# Patient Record
Sex: MALE | Race: WHITE | NOT HISPANIC OR LATINO | URBAN - METROPOLITAN AREA
[De-identification: names, ages, dates, MRNs, and addresses within clinical notes are randomized per-mention and may not be internally consistent; named-entity substitution may affect disease eponyms.]

---

## 2018-03-21 LAB
ALBUMIN SERPL BCP-MCNC: 4.6 G/DL (ref 3.5–5.7)
ALP SERPL-CCNC: 31 IU/L (ref 40–150)
ALT SERPL W P-5'-P-CCNC: 23 IU/L (ref 0–50)
ANION GAP SERPL CALCULATED.3IONS-SCNC: 11.4 MM/L
AST SERPL W P-5'-P-CCNC: 14 U/L (ref 8–27)
BASOPHILS # BLD AUTO: 0.1 X3/UL (ref 0–0.3)
BASOPHILS # BLD AUTO: 1.1 % (ref 0–2)
BILIRUB SERPL-MCNC: 0.7 MG/DL (ref 0.3–1)
BUN SERPL-MCNC: 16 MG/DL (ref 7–25)
CALCIUM SERPL-MCNC: 9.6 MG/DL (ref 8.6–10.5)
CHLORIDE SERPL-SCNC: 103 MM/L (ref 98–107)
CO2 SERPL-SCNC: 31 MM/L (ref 21–31)
CREAT SERPL-MCNC: 1.02 MG/DL (ref 0.7–1.3)
DEPRECATED RDW RBC AUTO: 12.8 % (ref 11.5–14.5)
EGFR (HISTORICAL): > 60 GFR
EGFR AFRICAN AMERICAN (HISTORICAL): > 60 GFR
EOSINOPHIL # BLD AUTO: 0.1 X3/UL (ref 0–0.5)
EOSINOPHIL NFR BLD AUTO: 2.3 % (ref 0–5)
GLUCOSE (HISTORICAL): 93 MG/DL (ref 65–99)
HCT VFR BLD AUTO: 45.2 % (ref 42–52)
HGB BLD-MCNC: 16 G/DL (ref 14–18)
LYMPHOCYTES # BLD AUTO: 1.3 X3/UL (ref 1.2–4.2)
LYMPHOCYTES NFR BLD AUTO: 29.4 % (ref 20.5–51.1)
MCH RBC QN AUTO: 31.1 PG (ref 26–34)
MCHC RBC AUTO-ENTMCNC: 35.3 G/DL (ref 31–36)
MCV RBC AUTO: 88.1 FL (ref 81–99)
MONOCYTES # BLD AUTO: 0.3 X3/UL (ref 0–1)
MONOCYTES NFR BLD AUTO: 7.6 % (ref 1.7–12)
NEUTROPHILS # BLD AUTO: 2.7 X3/UL (ref 1.4–6.5)
NEUTS SEG NFR BLD AUTO: 59.6 % (ref 42.2–75.2)
OSMOLALITY, SERUM (HISTORICAL): 282 MOSM (ref 262–291)
PLATELET # BLD AUTO: 212 X3/UL (ref 130–400)
PMV BLD AUTO: 8.4 FL (ref 8.6–11.7)
POTASSIUM SERPL-SCNC: 4.4 MM/L (ref 3.5–5.5)
RBC # BLD AUTO: 5.13 X6/UL (ref 4.3–5.9)
SODIUM SERPL-SCNC: 141 MM/L (ref 134–143)
TOTAL PROTEIN (HISTORICAL): 6.8 G/DL (ref 6.4–8.9)
WBC # BLD AUTO: 4.5 X3/UL (ref 4.8–10.8)

## 2018-03-22 LAB
HEPATITIS A IGM ANTIBODY (HISTORICAL): NEGATIVE
HEPATITIS B CORE IGM ANTIBODY (HISTORICAL): NEGATIVE
HEPATITIS B SURFACE ANTIGEN (HISTORICAL): NEGATIVE
HEPATITIS C ANTIBODY (HISTORICAL): <0.1 S/CO (ref 0–0.9)
VARICELLA ZOSTER (HISTORICAL): >4000 INDEX
VARICELLA ZOSTER IGM (HISTORICAL): <0.91 INDEX (ref 0–0.9)

## 2018-03-23 LAB
INTERPRETATION (HISTORICAL): NORMAL
QAUNTIFERON NIL VALUE (HISTORICAL): 0.03 IU/ML
QFT TB AG MINUS NIL VALUE (HISTORICAL): 0 IU/ML
QUANTIFERON CRITERIA (HISTORICAL): NORMAL
QUANTIFERON MITOGEN VALUE (HISTORICAL): 6.74 IU/ML
QUANTIFERON TB AG VALUE (HISTORICAL): 0.03 IU/ML
QUANTIFERON TB GOLD (HISTORICAL): NEGATIVE
QUANTIFERON TB GOLD (HISTORICAL): NORMAL

## 2018-10-30 ENCOUNTER — APPOINTMENT (OUTPATIENT)
Dept: LAB | Facility: MEDICAL CENTER | Age: 53
End: 2018-10-30
Payer: COMMERCIAL

## 2018-10-30 ENCOUNTER — TRANSCRIBE ORDERS (OUTPATIENT)
Dept: LAB | Facility: MEDICAL CENTER | Age: 53
End: 2018-10-30

## 2018-10-30 DIAGNOSIS — R53.83 OTHER FATIGUE: ICD-10-CM

## 2018-10-30 DIAGNOSIS — M25.50 ARTHRALGIA, UNSPECIFIED JOINT: ICD-10-CM

## 2018-10-30 DIAGNOSIS — R53.83 OTHER FATIGUE: Primary | ICD-10-CM

## 2018-10-30 DIAGNOSIS — G35: ICD-10-CM

## 2018-10-30 LAB
ALBUMIN SERPL BCP-MCNC: 4.3 G/DL (ref 3.5–5)
ALP SERPL-CCNC: 31 U/L (ref 46–116)
ALT SERPL W P-5'-P-CCNC: 30 U/L (ref 12–78)
ANION GAP SERPL CALCULATED.3IONS-SCNC: 6 MMOL/L (ref 4–13)
AST SERPL W P-5'-P-CCNC: 14 U/L (ref 5–45)
BILIRUB SERPL-MCNC: 1.58 MG/DL (ref 0.2–1)
BUN SERPL-MCNC: 24 MG/DL (ref 5–25)
CALCIUM SERPL-MCNC: 8.9 MG/DL (ref 8.3–10.1)
CHLORIDE SERPL-SCNC: 105 MMOL/L (ref 100–108)
CO2 SERPL-SCNC: 29 MMOL/L (ref 21–32)
CREAT SERPL-MCNC: 0.91 MG/DL (ref 0.6–1.3)
ERYTHROCYTE [DISTWIDTH] IN BLOOD BY AUTOMATED COUNT: 12.8 % (ref 11.6–15.1)
GFR SERPL CREATININE-BSD FRML MDRD: 97 ML/MIN/1.73SQ M
GLUCOSE P FAST SERPL-MCNC: 77 MG/DL (ref 65–99)
HCT VFR BLD AUTO: 46.2 % (ref 36.5–49.3)
HGB BLD-MCNC: 15.6 G/DL (ref 12–17)
MCH RBC QN AUTO: 30.5 PG (ref 26.8–34.3)
MCHC RBC AUTO-ENTMCNC: 33.8 G/DL (ref 31.4–37.4)
MCV RBC AUTO: 90 FL (ref 82–98)
PLATELET # BLD AUTO: 165 THOUSANDS/UL (ref 149–390)
PMV BLD AUTO: 10.8 FL (ref 8.9–12.7)
POTASSIUM SERPL-SCNC: 4.2 MMOL/L (ref 3.5–5.3)
PROT SERPL-MCNC: 7.5 G/DL (ref 6.4–8.2)
RBC # BLD AUTO: 5.12 MILLION/UL (ref 3.88–5.62)
SODIUM SERPL-SCNC: 140 MMOL/L (ref 136–145)
WBC # BLD AUTO: 3.06 THOUSAND/UL (ref 4.31–10.16)

## 2018-10-30 PROCEDURE — 80053 COMPREHEN METABOLIC PANEL: CPT

## 2018-10-30 PROCEDURE — 85027 COMPLETE CBC AUTOMATED: CPT

## 2018-10-30 PROCEDURE — 36415 COLL VENOUS BLD VENIPUNCTURE: CPT

## 2019-07-02 ENCOUNTER — APPOINTMENT (OUTPATIENT)
Dept: LAB | Facility: MEDICAL CENTER | Age: 54
End: 2019-07-02
Payer: COMMERCIAL

## 2019-07-02 ENCOUNTER — TRANSCRIBE ORDERS (OUTPATIENT)
Dept: LAB | Facility: MEDICAL CENTER | Age: 54
End: 2019-07-02

## 2019-07-02 DIAGNOSIS — E53.8 VITAMIN B 12 DEFICIENCY: ICD-10-CM

## 2019-07-02 DIAGNOSIS — M89.9 BONE DISEASE: ICD-10-CM

## 2019-07-02 DIAGNOSIS — G35 MULTIPLE SCLEROSIS (HCC): ICD-10-CM

## 2019-07-02 DIAGNOSIS — R53.83 FATIGUE, UNSPECIFIED TYPE: ICD-10-CM

## 2019-07-02 DIAGNOSIS — Z12.5 SCREENING PSA (PROSTATE SPECIFIC ANTIGEN): ICD-10-CM

## 2019-07-02 DIAGNOSIS — E78.5 HYPERLIPIDEMIA, UNSPECIFIED HYPERLIPIDEMIA TYPE: ICD-10-CM

## 2019-07-02 DIAGNOSIS — Z51.81 ENCOUNTER FOR THERAPEUTIC DRUG MONITORING: ICD-10-CM

## 2019-07-02 DIAGNOSIS — Z51.81 ENCOUNTER FOR THERAPEUTIC DRUG MONITORING: Primary | ICD-10-CM

## 2019-07-02 DIAGNOSIS — E78.5 HYPERLIPIDEMIA, UNSPECIFIED HYPERLIPIDEMIA TYPE: Primary | ICD-10-CM

## 2019-07-02 LAB
25(OH)D3 SERPL-MCNC: 37.6 NG/ML (ref 30–100)
BASOPHILS # BLD AUTO: 0.02 THOUSANDS/ΜL (ref 0–0.1)
BASOPHILS NFR BLD AUTO: 1 % (ref 0–1)
CHOLEST SERPL-MCNC: 246 MG/DL (ref 50–200)
EOSINOPHIL # BLD AUTO: 0.04 THOUSAND/ΜL (ref 0–0.61)
EOSINOPHIL NFR BLD AUTO: 1 % (ref 0–6)
ERYTHROCYTE [DISTWIDTH] IN BLOOD BY AUTOMATED COUNT: 12.7 % (ref 11.6–15.1)
HCT VFR BLD AUTO: 44.5 % (ref 36.5–49.3)
HDLC SERPL-MCNC: 48 MG/DL (ref 40–60)
HGB BLD-MCNC: 14.9 G/DL (ref 12–17)
IMM GRANULOCYTES # BLD AUTO: 0.01 THOUSAND/UL (ref 0–0.2)
IMM GRANULOCYTES NFR BLD AUTO: 0 % (ref 0–2)
LDLC SERPL CALC-MCNC: 167 MG/DL (ref 0–100)
LYMPHOCYTES # BLD AUTO: 0.34 THOUSANDS/ΜL (ref 0.6–4.47)
LYMPHOCYTES NFR BLD AUTO: 10 % (ref 14–44)
MCH RBC QN AUTO: 30.2 PG (ref 26.8–34.3)
MCHC RBC AUTO-ENTMCNC: 33.5 G/DL (ref 31.4–37.4)
MCV RBC AUTO: 90 FL (ref 82–98)
MONOCYTES # BLD AUTO: 0.32 THOUSAND/ΜL (ref 0.17–1.22)
MONOCYTES NFR BLD AUTO: 9 % (ref 4–12)
NEUTROPHILS # BLD AUTO: 2.76 THOUSANDS/ΜL (ref 1.85–7.62)
NEUTS SEG NFR BLD AUTO: 79 % (ref 43–75)
NONHDLC SERPL-MCNC: 198 MG/DL
NRBC BLD AUTO-RTO: 0 /100 WBCS
PLATELET # BLD AUTO: 173 THOUSANDS/UL (ref 149–390)
PMV BLD AUTO: 10.7 FL (ref 8.9–12.7)
PSA SERPL-MCNC: 0.4 NG/ML (ref 0–4)
RBC # BLD AUTO: 4.93 MILLION/UL (ref 3.88–5.62)
T4 FREE SERPL-MCNC: 1.05 NG/DL (ref 0.76–1.46)
TRIGL SERPL-MCNC: 155 MG/DL
TSH SERPL DL<=0.05 MIU/L-ACNC: 2 UIU/ML (ref 0.36–3.74)
VIT B12 SERPL-MCNC: 746 PG/ML (ref 100–900)
WBC # BLD AUTO: 3.49 THOUSAND/UL (ref 4.31–10.16)

## 2019-07-02 PROCEDURE — 84443 ASSAY THYROID STIM HORMONE: CPT

## 2019-07-02 PROCEDURE — G0103 PSA SCREENING: HCPCS

## 2019-07-02 PROCEDURE — 82306 VITAMIN D 25 HYDROXY: CPT

## 2019-07-02 PROCEDURE — 85025 COMPLETE CBC W/AUTO DIFF WBC: CPT

## 2019-07-02 PROCEDURE — 84439 ASSAY OF FREE THYROXINE: CPT

## 2019-07-02 PROCEDURE — 82607 VITAMIN B-12: CPT

## 2019-07-02 PROCEDURE — 36415 COLL VENOUS BLD VENIPUNCTURE: CPT

## 2019-07-02 PROCEDURE — 80061 LIPID PANEL: CPT

## 2019-10-24 ENCOUNTER — APPOINTMENT (OUTPATIENT)
Dept: LAB | Facility: MEDICAL CENTER | Age: 54
End: 2019-10-24
Payer: COMMERCIAL

## 2019-10-24 ENCOUNTER — TRANSCRIBE ORDERS (OUTPATIENT)
Dept: LAB | Facility: MEDICAL CENTER | Age: 54
End: 2019-10-24

## 2019-10-24 DIAGNOSIS — E55.9 VITAMIN D DEFICIENCY: ICD-10-CM

## 2019-10-24 DIAGNOSIS — E55.9 VITAMIN D DEFICIENCY: Primary | ICD-10-CM

## 2019-10-24 DIAGNOSIS — D64.9 ANEMIA, UNSPECIFIED TYPE: ICD-10-CM

## 2019-10-24 LAB
ERYTHROCYTE [DISTWIDTH] IN BLOOD BY AUTOMATED COUNT: 13 % (ref 11.6–15.1)
HCT VFR BLD AUTO: 44.2 % (ref 36.5–49.3)
HGB BLD-MCNC: 15 G/DL (ref 12–17)
MCH RBC QN AUTO: 30.5 PG (ref 26.8–34.3)
MCHC RBC AUTO-ENTMCNC: 33.9 G/DL (ref 31.4–37.4)
MCV RBC AUTO: 90 FL (ref 82–98)
PLATELET # BLD AUTO: 182 THOUSANDS/UL (ref 149–390)
PMV BLD AUTO: 10.8 FL (ref 8.9–12.7)
RBC # BLD AUTO: 4.91 MILLION/UL (ref 3.88–5.62)
WBC # BLD AUTO: 3.23 THOUSAND/UL (ref 4.31–10.16)

## 2019-10-24 PROCEDURE — 85027 COMPLETE CBC AUTOMATED: CPT

## 2019-10-24 PROCEDURE — 82306 VITAMIN D 25 HYDROXY: CPT

## 2019-10-24 PROCEDURE — 36415 COLL VENOUS BLD VENIPUNCTURE: CPT

## 2019-10-25 LAB — 25(OH)D3 SERPL-MCNC: 56.4 NG/ML (ref 30–100)

## 2019-10-31 ENCOUNTER — TRANSCRIBE ORDERS (OUTPATIENT)
Dept: LAB | Facility: MEDICAL CENTER | Age: 54
End: 2019-10-31

## 2019-10-31 ENCOUNTER — APPOINTMENT (OUTPATIENT)
Dept: LAB | Facility: MEDICAL CENTER | Age: 54
End: 2019-10-31
Payer: COMMERCIAL

## 2019-10-31 DIAGNOSIS — D70.9 NEUTROPENIA, UNSPECIFIED TYPE (HCC): Primary | ICD-10-CM

## 2019-10-31 DIAGNOSIS — D70.9 NEUTROPENIA, UNSPECIFIED TYPE (HCC): ICD-10-CM

## 2019-10-31 LAB
BASOPHILS # BLD AUTO: 0.05 THOUSANDS/ΜL (ref 0–0.1)
BASOPHILS NFR BLD AUTO: 1 % (ref 0–1)
EOSINOPHIL # BLD AUTO: 0.08 THOUSAND/ΜL (ref 0–0.61)
EOSINOPHIL NFR BLD AUTO: 2 % (ref 0–6)
ERYTHROCYTE [DISTWIDTH] IN BLOOD BY AUTOMATED COUNT: 13.1 % (ref 11.6–15.1)
HCT VFR BLD AUTO: 45.2 % (ref 36.5–49.3)
HGB BLD-MCNC: 14.8 G/DL (ref 12–17)
IMM GRANULOCYTES # BLD AUTO: 0.01 THOUSAND/UL (ref 0–0.2)
IMM GRANULOCYTES NFR BLD AUTO: 0 % (ref 0–2)
LYMPHOCYTES # BLD AUTO: 0.4 THOUSANDS/ΜL (ref 0.6–4.47)
LYMPHOCYTES NFR BLD AUTO: 10 % (ref 14–44)
MCH RBC QN AUTO: 30 PG (ref 26.8–34.3)
MCHC RBC AUTO-ENTMCNC: 32.7 G/DL (ref 31.4–37.4)
MCV RBC AUTO: 92 FL (ref 82–98)
MONOCYTES # BLD AUTO: 0.53 THOUSAND/ΜL (ref 0.17–1.22)
MONOCYTES NFR BLD AUTO: 13 % (ref 4–12)
NEUTROPHILS # BLD AUTO: 2.96 THOUSANDS/ΜL (ref 1.85–7.62)
NEUTS SEG NFR BLD AUTO: 74 % (ref 43–75)
NRBC BLD AUTO-RTO: 0 /100 WBCS
PLATELET # BLD AUTO: 189 THOUSANDS/UL (ref 149–390)
PMV BLD AUTO: 10.5 FL (ref 8.9–12.7)
RBC # BLD AUTO: 4.94 MILLION/UL (ref 3.88–5.62)
WBC # BLD AUTO: 4.03 THOUSAND/UL (ref 4.31–10.16)

## 2019-10-31 PROCEDURE — 36415 COLL VENOUS BLD VENIPUNCTURE: CPT

## 2019-10-31 PROCEDURE — 85025 COMPLETE CBC W/AUTO DIFF WBC: CPT

## 2020-03-12 ENCOUNTER — TRANSCRIBE ORDERS (OUTPATIENT)
Dept: LAB | Facility: MEDICAL CENTER | Age: 55
End: 2020-03-12

## 2020-03-12 ENCOUNTER — APPOINTMENT (OUTPATIENT)
Dept: LAB | Facility: MEDICAL CENTER | Age: 55
End: 2020-03-12
Payer: COMMERCIAL

## 2020-03-12 DIAGNOSIS — Z51.81 ENCOUNTER FOR THERAPEUTIC DRUG MONITORING: ICD-10-CM

## 2020-03-12 DIAGNOSIS — G35 MULTIPLE SCLEROSIS (HCC): Primary | ICD-10-CM

## 2020-03-12 DIAGNOSIS — G35 MULTIPLE SCLEROSIS (HCC): ICD-10-CM

## 2020-03-12 LAB
ALBUMIN SERPL BCP-MCNC: 4.4 G/DL (ref 3.5–5)
ALP SERPL-CCNC: 34 U/L (ref 46–116)
ALT SERPL W P-5'-P-CCNC: 21 U/L (ref 12–78)
AST SERPL W P-5'-P-CCNC: 7 U/L (ref 5–45)
BASOPHILS # BLD AUTO: 0.04 THOUSANDS/ΜL (ref 0–0.1)
BASOPHILS NFR BLD AUTO: 1 % (ref 0–1)
BILIRUB DIRECT SERPL-MCNC: 0.22 MG/DL (ref 0–0.2)
BILIRUB SERPL-MCNC: 1.45 MG/DL (ref 0.2–1)
EOSINOPHIL # BLD AUTO: 0.05 THOUSAND/ΜL (ref 0–0.61)
EOSINOPHIL NFR BLD AUTO: 1 % (ref 0–6)
ERYTHROCYTE [DISTWIDTH] IN BLOOD BY AUTOMATED COUNT: 12.6 % (ref 11.6–15.1)
HCT VFR BLD AUTO: 47.9 % (ref 36.5–49.3)
HGB BLD-MCNC: 15.8 G/DL (ref 12–17)
IMM GRANULOCYTES # BLD AUTO: 0.02 THOUSAND/UL (ref 0–0.2)
IMM GRANULOCYTES NFR BLD AUTO: 0 % (ref 0–2)
LYMPHOCYTES # BLD AUTO: 0.41 THOUSANDS/ΜL (ref 0.6–4.47)
LYMPHOCYTES NFR BLD AUTO: 9 % (ref 14–44)
MCH RBC QN AUTO: 30.3 PG (ref 26.8–34.3)
MCHC RBC AUTO-ENTMCNC: 33 G/DL (ref 31.4–37.4)
MCV RBC AUTO: 92 FL (ref 82–98)
MONOCYTES # BLD AUTO: 0.38 THOUSAND/ΜL (ref 0.17–1.22)
MONOCYTES NFR BLD AUTO: 9 % (ref 4–12)
NEUTROPHILS # BLD AUTO: 3.57 THOUSANDS/ΜL (ref 1.85–7.62)
NEUTS SEG NFR BLD AUTO: 80 % (ref 43–75)
NRBC BLD AUTO-RTO: 0 /100 WBCS
PLATELET # BLD AUTO: 175 THOUSANDS/UL (ref 149–390)
PMV BLD AUTO: 10.9 FL (ref 8.9–12.7)
PROT SERPL-MCNC: 7.4 G/DL (ref 6.4–8.2)
RBC # BLD AUTO: 5.21 MILLION/UL (ref 3.88–5.62)
WBC # BLD AUTO: 4.47 THOUSAND/UL (ref 4.31–10.16)

## 2020-03-12 PROCEDURE — 36415 COLL VENOUS BLD VENIPUNCTURE: CPT

## 2020-03-12 PROCEDURE — 85025 COMPLETE CBC W/AUTO DIFF WBC: CPT

## 2020-03-12 PROCEDURE — 80076 HEPATIC FUNCTION PANEL: CPT

## 2020-12-01 ENCOUNTER — TRANSCRIBE ORDERS (OUTPATIENT)
Dept: LAB | Facility: MEDICAL CENTER | Age: 55
End: 2020-12-01

## 2020-12-01 ENCOUNTER — LAB (OUTPATIENT)
Dept: LAB | Facility: MEDICAL CENTER | Age: 55
End: 2020-12-01
Payer: COMMERCIAL

## 2020-12-01 DIAGNOSIS — G35 MS (MULTIPLE SCLEROSIS) (HCC): ICD-10-CM

## 2020-12-01 DIAGNOSIS — G35 MS (MULTIPLE SCLEROSIS) (HCC): Primary | ICD-10-CM

## 2020-12-01 DIAGNOSIS — E55.9 VITAMIN D DEFICIENCY: ICD-10-CM

## 2020-12-01 DIAGNOSIS — Z51.81 ENCOUNTER FOR THERAPEUTIC DRUG MONITORING: ICD-10-CM

## 2020-12-01 LAB
25(OH)D3 SERPL-MCNC: 79 NG/ML (ref 30–100)
ALBUMIN SERPL BCP-MCNC: 4.5 G/DL (ref 3.5–5)
ALP SERPL-CCNC: 37 U/L (ref 46–116)
ALT SERPL W P-5'-P-CCNC: 20 U/L (ref 12–78)
AST SERPL W P-5'-P-CCNC: 8 U/L (ref 5–45)
BASOPHILS # BLD AUTO: 0.04 THOUSANDS/ΜL (ref 0–0.1)
BASOPHILS NFR BLD AUTO: 1 % (ref 0–1)
BILIRUB DIRECT SERPL-MCNC: 0.25 MG/DL (ref 0–0.2)
BILIRUB SERPL-MCNC: 1.5 MG/DL (ref 0.2–1)
EOSINOPHIL # BLD AUTO: 0.05 THOUSAND/ΜL (ref 0–0.61)
EOSINOPHIL NFR BLD AUTO: 1 % (ref 0–6)
ERYTHROCYTE [DISTWIDTH] IN BLOOD BY AUTOMATED COUNT: 12.5 % (ref 11.6–15.1)
HCT VFR BLD AUTO: 47.6 % (ref 36.5–49.3)
HGB BLD-MCNC: 15.7 G/DL (ref 12–17)
IMM GRANULOCYTES # BLD AUTO: 0.01 THOUSAND/UL (ref 0–0.2)
IMM GRANULOCYTES NFR BLD AUTO: 0 % (ref 0–2)
LYMPHOCYTES # BLD AUTO: 0.34 THOUSANDS/ΜL (ref 0.6–4.47)
LYMPHOCYTES NFR BLD AUTO: 9 % (ref 14–44)
MCH RBC QN AUTO: 30 PG (ref 26.8–34.3)
MCHC RBC AUTO-ENTMCNC: 33 G/DL (ref 31.4–37.4)
MCV RBC AUTO: 91 FL (ref 82–98)
MONOCYTES # BLD AUTO: 0.31 THOUSAND/ΜL (ref 0.17–1.22)
MONOCYTES NFR BLD AUTO: 8 % (ref 4–12)
NEUTROPHILS # BLD AUTO: 3.07 THOUSANDS/ΜL (ref 1.85–7.62)
NEUTS SEG NFR BLD AUTO: 81 % (ref 43–75)
NRBC BLD AUTO-RTO: 0 /100 WBCS
PLATELET # BLD AUTO: 185 THOUSANDS/UL (ref 149–390)
PMV BLD AUTO: 10.1 FL (ref 8.9–12.7)
PROT SERPL-MCNC: 7.6 G/DL (ref 6.4–8.2)
RBC # BLD AUTO: 5.23 MILLION/UL (ref 3.88–5.62)
WBC # BLD AUTO: 3.82 THOUSAND/UL (ref 4.31–10.16)

## 2020-12-01 PROCEDURE — 80076 HEPATIC FUNCTION PANEL: CPT

## 2020-12-01 PROCEDURE — 85025 COMPLETE CBC W/AUTO DIFF WBC: CPT

## 2020-12-01 PROCEDURE — 36415 COLL VENOUS BLD VENIPUNCTURE: CPT

## 2020-12-01 PROCEDURE — 82306 VITAMIN D 25 HYDROXY: CPT

## 2021-03-01 ENCOUNTER — TRANSCRIBE ORDERS (OUTPATIENT)
Dept: LAB | Facility: MEDICAL CENTER | Age: 56
End: 2021-03-01

## 2021-03-01 ENCOUNTER — LAB (OUTPATIENT)
Dept: LAB | Facility: MEDICAL CENTER | Age: 56
End: 2021-03-01
Payer: COMMERCIAL

## 2021-03-01 DIAGNOSIS — G35 MULTIPLE SCLEROSIS (HCC): Primary | ICD-10-CM

## 2021-03-01 DIAGNOSIS — G35 MULTIPLE SCLEROSIS (HCC): ICD-10-CM

## 2021-03-01 LAB
BASOPHILS # BLD AUTO: 0.07 THOUSANDS/ΜL (ref 0–0.1)
BASOPHILS NFR BLD AUTO: 1 % (ref 0–1)
EOSINOPHIL # BLD AUTO: 0.06 THOUSAND/ΜL (ref 0–0.61)
EOSINOPHIL NFR BLD AUTO: 1 % (ref 0–6)
ERYTHROCYTE [DISTWIDTH] IN BLOOD BY AUTOMATED COUNT: 12.3 % (ref 11.6–15.1)
HCT VFR BLD AUTO: 47.5 % (ref 36.5–49.3)
HGB BLD-MCNC: 16.1 G/DL (ref 12–17)
IMM GRANULOCYTES # BLD AUTO: 0.02 THOUSAND/UL (ref 0–0.2)
IMM GRANULOCYTES NFR BLD AUTO: 0 % (ref 0–2)
LYMPHOCYTES # BLD AUTO: 0.49 THOUSANDS/ΜL (ref 0.6–4.47)
LYMPHOCYTES NFR BLD AUTO: 10 % (ref 14–44)
MCH RBC QN AUTO: 29.7 PG (ref 26.8–34.3)
MCHC RBC AUTO-ENTMCNC: 33.9 G/DL (ref 31.4–37.4)
MCV RBC AUTO: 88 FL (ref 82–98)
MONOCYTES # BLD AUTO: 0.51 THOUSAND/ΜL (ref 0.17–1.22)
MONOCYTES NFR BLD AUTO: 10 % (ref 4–12)
NEUTROPHILS # BLD AUTO: 4.03 THOUSANDS/ΜL (ref 1.85–7.62)
NEUTS SEG NFR BLD AUTO: 78 % (ref 43–75)
NRBC BLD AUTO-RTO: 0 /100 WBCS
PLATELET # BLD AUTO: 200 THOUSANDS/UL (ref 149–390)
PMV BLD AUTO: 10.1 FL (ref 8.9–12.7)
RBC # BLD AUTO: 5.42 MILLION/UL (ref 3.88–5.62)
WBC # BLD AUTO: 5.18 THOUSAND/UL (ref 4.31–10.16)

## 2021-03-01 PROCEDURE — 36415 COLL VENOUS BLD VENIPUNCTURE: CPT

## 2021-03-01 PROCEDURE — 85025 COMPLETE CBC W/AUTO DIFF WBC: CPT

## 2021-03-10 DIAGNOSIS — Z23 ENCOUNTER FOR IMMUNIZATION: ICD-10-CM

## 2021-03-25 DIAGNOSIS — Z20.828 EXPOSURE TO SARS-ASSOCIATED CORONAVIRUS: ICD-10-CM

## 2021-03-25 DIAGNOSIS — R52 BODY ACHES: ICD-10-CM

## 2021-03-25 PROCEDURE — U0003 INFECTIOUS AGENT DETECTION BY NUCLEIC ACID (DNA OR RNA); SEVERE ACUTE RESPIRATORY SYNDROME CORONAVIRUS 2 (SARS-COV-2) (CORONAVIRUS DISEASE [COVID-19]), AMPLIFIED PROBE TECHNIQUE, MAKING USE OF HIGH THROUGHPUT TECHNOLOGIES AS DESCRIBED BY CMS-2020-01-R: HCPCS | Performed by: NURSE PRACTITIONER

## 2021-03-25 PROCEDURE — U0005 INFEC AGEN DETEC AMPLI PROBE: HCPCS | Performed by: NURSE PRACTITIONER

## 2021-03-27 LAB — SARS-COV-2 RNA RESP QL NAA+PROBE: POSITIVE

## 2021-12-22 PROCEDURE — U0005 INFEC AGEN DETEC AMPLI PROBE: HCPCS | Performed by: FAMILY MEDICINE

## 2021-12-22 PROCEDURE — U0003 INFECTIOUS AGENT DETECTION BY NUCLEIC ACID (DNA OR RNA); SEVERE ACUTE RESPIRATORY SYNDROME CORONAVIRUS 2 (SARS-COV-2) (CORONAVIRUS DISEASE [COVID-19]), AMPLIFIED PROBE TECHNIQUE, MAKING USE OF HIGH THROUGHPUT TECHNOLOGIES AS DESCRIBED BY CMS-2020-01-R: HCPCS | Performed by: FAMILY MEDICINE

## 2022-01-01 PROCEDURE — U0005 INFEC AGEN DETEC AMPLI PROBE: HCPCS | Performed by: INTERNAL MEDICINE

## 2022-01-01 PROCEDURE — U0003 INFECTIOUS AGENT DETECTION BY NUCLEIC ACID (DNA OR RNA); SEVERE ACUTE RESPIRATORY SYNDROME CORONAVIRUS 2 (SARS-COV-2) (CORONAVIRUS DISEASE [COVID-19]), AMPLIFIED PROBE TECHNIQUE, MAKING USE OF HIGH THROUGHPUT TECHNOLOGIES AS DESCRIBED BY CMS-2020-01-R: HCPCS | Performed by: INTERNAL MEDICINE

## 2022-07-13 ENCOUNTER — APPOINTMENT (OUTPATIENT)
Dept: LAB | Facility: MEDICAL CENTER | Age: 57
End: 2022-07-13
Payer: COMMERCIAL

## 2022-07-13 DIAGNOSIS — G35 MS (MULTIPLE SCLEROSIS) (HCC): ICD-10-CM

## 2022-07-13 DIAGNOSIS — R63.5 WEIGHT GAIN: ICD-10-CM

## 2022-07-13 DIAGNOSIS — R35.1 NOCTURIA: ICD-10-CM

## 2022-07-13 DIAGNOSIS — E78.5 HYPERLIPIDEMIA, UNSPECIFIED HYPERLIPIDEMIA TYPE: ICD-10-CM

## 2022-07-13 DIAGNOSIS — E53.8 LOW VITAMIN B12 LEVEL: ICD-10-CM

## 2022-07-13 DIAGNOSIS — N40.0 BENIGN PROSTATIC HYPERPLASIA, UNSPECIFIED WHETHER LOWER URINARY TRACT SYMPTOMS PRESENT: ICD-10-CM

## 2022-07-13 DIAGNOSIS — R53.83 OTHER FATIGUE: ICD-10-CM

## 2022-07-13 DIAGNOSIS — M19.90 OSTEOARTHRITIS, UNSPECIFIED OSTEOARTHRITIS TYPE, UNSPECIFIED SITE: ICD-10-CM

## 2022-07-13 LAB
ALBUMIN SERPL BCP-MCNC: 4.3 G/DL (ref 3.5–5)
ALP SERPL-CCNC: 36 U/L (ref 46–116)
ALT SERPL W P-5'-P-CCNC: 26 U/L (ref 12–78)
ANION GAP SERPL CALCULATED.3IONS-SCNC: 7 MMOL/L (ref 4–13)
AST SERPL W P-5'-P-CCNC: 14 U/L (ref 5–45)
BASOPHILS # BLD AUTO: 0.04 THOUSANDS/ΜL (ref 0–0.1)
BASOPHILS NFR BLD AUTO: 1 % (ref 0–1)
BILIRUB SERPL-MCNC: 1.53 MG/DL (ref 0.2–1)
BUN SERPL-MCNC: 20 MG/DL (ref 5–25)
CALCIUM SERPL-MCNC: 9.1 MG/DL (ref 8.3–10.1)
CHLORIDE SERPL-SCNC: 107 MMOL/L (ref 100–108)
CHOLEST SERPL-MCNC: 240 MG/DL
CO2 SERPL-SCNC: 24 MMOL/L (ref 21–32)
CREAT SERPL-MCNC: 0.98 MG/DL (ref 0.6–1.3)
EOSINOPHIL # BLD AUTO: 0.06 THOUSAND/ΜL (ref 0–0.61)
EOSINOPHIL NFR BLD AUTO: 1 % (ref 0–6)
ERYTHROCYTE [DISTWIDTH] IN BLOOD BY AUTOMATED COUNT: 12.9 % (ref 11.6–15.1)
GFR SERPL CREATININE-BSD FRML MDRD: 86 ML/MIN/1.73SQ M
GLUCOSE P FAST SERPL-MCNC: 91 MG/DL (ref 65–99)
HCT VFR BLD AUTO: 45.2 % (ref 36.5–49.3)
HDLC SERPL-MCNC: 47 MG/DL
HGB BLD-MCNC: 15 G/DL (ref 12–17)
IMM GRANULOCYTES # BLD AUTO: 0.02 THOUSAND/UL (ref 0–0.2)
IMM GRANULOCYTES NFR BLD AUTO: 1 % (ref 0–2)
LDLC SERPL CALC-MCNC: 149 MG/DL (ref 0–100)
LYMPHOCYTES # BLD AUTO: 0.71 THOUSANDS/ΜL (ref 0.6–4.47)
LYMPHOCYTES NFR BLD AUTO: 17 % (ref 14–44)
MCH RBC QN AUTO: 29.5 PG (ref 26.8–34.3)
MCHC RBC AUTO-ENTMCNC: 33.2 G/DL (ref 31.4–37.4)
MCV RBC AUTO: 89 FL (ref 82–98)
MONOCYTES # BLD AUTO: 0.37 THOUSAND/ΜL (ref 0.17–1.22)
MONOCYTES NFR BLD AUTO: 9 % (ref 4–12)
NEUTROPHILS # BLD AUTO: 2.96 THOUSANDS/ΜL (ref 1.85–7.62)
NEUTS SEG NFR BLD AUTO: 71 % (ref 43–75)
NONHDLC SERPL-MCNC: 193 MG/DL
NRBC BLD AUTO-RTO: 0 /100 WBCS
PLATELET # BLD AUTO: 192 THOUSANDS/UL (ref 149–390)
PMV BLD AUTO: 11 FL (ref 8.9–12.7)
POTASSIUM SERPL-SCNC: 4 MMOL/L (ref 3.5–5.3)
PROT SERPL-MCNC: 7.6 G/DL (ref 6.4–8.2)
PSA SERPL-MCNC: 0.8 NG/ML (ref 0–4)
RBC # BLD AUTO: 5.09 MILLION/UL (ref 3.88–5.62)
SODIUM SERPL-SCNC: 138 MMOL/L (ref 136–145)
T4 FREE SERPL-MCNC: 1.1 NG/DL (ref 0.76–1.46)
TRIGL SERPL-MCNC: 221 MG/DL
TSH SERPL DL<=0.05 MIU/L-ACNC: 2.59 UIU/ML (ref 0.45–4.5)
VIT B12 SERPL-MCNC: 570 PG/ML (ref 100–900)
WBC # BLD AUTO: 4.16 THOUSAND/UL (ref 4.31–10.16)

## 2022-07-13 PROCEDURE — G0103 PSA SCREENING: HCPCS

## 2022-07-13 PROCEDURE — 84443 ASSAY THYROID STIM HORMONE: CPT

## 2022-07-13 PROCEDURE — 80053 COMPREHEN METABOLIC PANEL: CPT

## 2022-07-13 PROCEDURE — 82607 VITAMIN B-12: CPT

## 2022-07-13 PROCEDURE — 85025 COMPLETE CBC W/AUTO DIFF WBC: CPT

## 2022-07-13 PROCEDURE — 80061 LIPID PANEL: CPT

## 2022-07-13 PROCEDURE — 36415 COLL VENOUS BLD VENIPUNCTURE: CPT

## 2022-07-13 PROCEDURE — 84439 ASSAY OF FREE THYROXINE: CPT

## 2023-10-27 ENCOUNTER — IMMUNIZATIONS (OUTPATIENT)
Dept: FAMILY MEDICINE CLINIC | Facility: CLINIC | Age: 58
End: 2023-10-27
Payer: COMMERCIAL

## 2023-10-27 DIAGNOSIS — Z23 ENCOUNTER FOR IMMUNIZATION: Primary | ICD-10-CM

## 2023-10-27 PROCEDURE — 90471 IMMUNIZATION ADMIN: CPT

## 2023-10-27 PROCEDURE — 90686 IIV4 VACC NO PRSV 0.5 ML IM: CPT

## 2023-11-20 DIAGNOSIS — U07.1 COVID: Primary | ICD-10-CM

## 2023-11-20 RX ORDER — NIRMATRELVIR AND RITONAVIR 300-100 MG
3 KIT ORAL 2 TIMES DAILY
Qty: 30 TABLET | Refills: 0 | Status: SHIPPED | OUTPATIENT
Start: 2023-11-20 | End: 2023-11-25

## 2023-11-28 ENCOUNTER — OFFICE VISIT (OUTPATIENT)
Dept: FAMILY MEDICINE CLINIC | Facility: CLINIC | Age: 58
End: 2023-11-28
Payer: COMMERCIAL

## 2023-11-28 VITALS
BODY MASS INDEX: 32.13 KG/M2 | TEMPERATURE: 97.8 F | DIASTOLIC BLOOD PRESSURE: 90 MMHG | WEIGHT: 258.4 LBS | HEART RATE: 86 BPM | OXYGEN SATURATION: 99 % | SYSTOLIC BLOOD PRESSURE: 130 MMHG | HEIGHT: 75 IN

## 2023-11-28 DIAGNOSIS — J32.9 SINUSITIS, UNSPECIFIED CHRONICITY, UNSPECIFIED LOCATION: ICD-10-CM

## 2023-11-28 DIAGNOSIS — U07.1 COVID: ICD-10-CM

## 2023-11-28 DIAGNOSIS — H81.311 VERTIGO, AURAL, RIGHT: Primary | ICD-10-CM

## 2023-11-28 DIAGNOSIS — G93.31 POSTVIRAL FATIGUE SYNDROME: ICD-10-CM

## 2023-11-28 DIAGNOSIS — E78.5 HYPERLIPIDEMIA, UNSPECIFIED HYPERLIPIDEMIA TYPE: ICD-10-CM

## 2023-11-28 DIAGNOSIS — R53.83 OTHER FATIGUE: ICD-10-CM

## 2023-11-28 PROCEDURE — 99213 OFFICE O/P EST LOW 20 MIN: CPT | Performed by: NURSE PRACTITIONER

## 2023-11-28 NOTE — PROGRESS NOTES
Name: Sintia Gross      : 1965      MRN: [id number]  Encounter Provider: DREW Amaro  Encounter Date: 2023   Encounter department: [address] [facility]    Assessment & Plan     1. Vertigo, aural, right  Comments:  meclizine otc    2. Sinusitis, unspecified chronicity, unspecified location  Comments:  viral-suda,flonase after labs, f/u inb    3. Postviral fatigue syndrome  Comments:  labs    4. COVID  Comments:  much improved        Depression Screening and Follow-up Plan: Patient was screened for depression during today's encounter. They screened negative with a PHQ-2 score of 0. Nina Edmonds presents on day 10 of his COVID illness. He felt awful through much of it. He felt aches and fever and fatigue head congestion. No chest pain or shortness of breath. Continues with a headache and sinus pressure. He feels lightheaded triggered by head movement. .  Balance feels a bit off. Does not feel syncopal.  Did return to work. Just very tired and trouble making it through the day. No rashes no joint pain no nausea vomiting or diarrhea. Appetites been good. Has had some Gatorade but his wife and he do not think he has been drinking enough fluids. Takes no medication. .  Last blood work was 16 months ago. Borderline WBCs. He has a diagnosis of MS that is been stable. He had been on medication but it was stopped during COVID crisis due to its potential effect on WBCs. Review of Systems   Constitutional:  Positive for fatigue and fever. Negative for appetite change, chills and diaphoresis. HENT:  Positive for sinus pressure and sinus pain. Negative for sore throat. Respiratory:  Negative for cough and shortness of breath. Cardiovascular:  Negative for palpitations and leg swelling. Gastrointestinal:  Negative for nausea and vomiting. Musculoskeletal:  Negative for arthralgias. Neurological:  Positive for light-headedness.  Negative for dizziness and syncope. No current outpatient medications on file prior to visit. Objective     /90 (BP Location: Left arm, Patient Position: Sitting, Cuff Size: Adult)   Pulse 86   Temp 97.8 °F (36.6 °C) (Tympanic)   Ht 6' 3" (1.905 m)   Wt 117 kg (258 lb 6.4 oz)   SpO2 99%   BMI 32.30 kg/m²     Physical Exam  Constitutional:       General: He is not in acute distress. Appearance: Normal appearance. He is normal weight. He is not ill-appearing, toxic-appearing or diaphoretic. HENT:      Head: Normocephalic. Comments: Right TM slightly retracted     Left Ear: Tympanic membrane normal.      Nose:      Comments: Nasal passages with turbinates very swollen and pink. Not not really red but there is a small amount of yellow discharge on the right     Mouth/Throat:      Mouth: Mucous membranes are moist.      Pharynx: Oropharynx is clear. Cardiovascular:      Rate and Rhythm: Normal rate and regular rhythm. Heart sounds: Normal heart sounds. No murmur heard. Comments: Bp 106/82 sitting   120 / 78 stand no ortho  Pulmonary:      Effort: Pulmonary effort is normal.      Breath sounds: Normal breath sounds. Abdominal:      Palpations: Abdomen is soft. Musculoskeletal:      Cervical back: No rigidity or tenderness. Right lower leg: No edema. Left lower leg: No edema. Lymphadenopathy:      Cervical: No cervical adenopathy. Neurological:      Mental Status: He is alert. Psychiatric:         Mood and Affect: Mood normal.         Behavior: Behavior normal.         Thought Content:  Thought content normal.       DREW Garcia

## 2023-11-29 ENCOUNTER — LAB (OUTPATIENT)
Age: 58
End: 2023-11-29
Payer: COMMERCIAL

## 2023-11-29 DIAGNOSIS — R53.83 OTHER FATIGUE: ICD-10-CM

## 2023-11-29 DIAGNOSIS — E78.5 HYPERLIPIDEMIA, UNSPECIFIED HYPERLIPIDEMIA TYPE: ICD-10-CM

## 2023-11-29 LAB
ALBUMIN SERPL BCP-MCNC: 4.5 G/DL (ref 3.5–5)
ALP SERPL-CCNC: 26 U/L (ref 34–104)
ALT SERPL W P-5'-P-CCNC: 16 U/L (ref 7–52)
ANION GAP SERPL CALCULATED.3IONS-SCNC: 8 MMOL/L
AST SERPL W P-5'-P-CCNC: 15 U/L (ref 13–39)
BASOPHILS # BLD AUTO: 0.06 THOUSANDS/ÂΜL (ref 0–0.1)
BASOPHILS NFR BLD AUTO: 1 % (ref 0–1)
BILIRUB SERPL-MCNC: 0.94 MG/DL (ref 0.2–1)
BUN SERPL-MCNC: 19 MG/DL (ref 5–25)
CALCIUM SERPL-MCNC: 9.4 MG/DL (ref 8.4–10.2)
CHLORIDE SERPL-SCNC: 103 MMOL/L (ref 96–108)
CHOLEST SERPL-MCNC: 249 MG/DL
CO2 SERPL-SCNC: 28 MMOL/L (ref 21–32)
CREAT SERPL-MCNC: 1.05 MG/DL (ref 0.6–1.3)
EOSINOPHIL # BLD AUTO: 0.08 THOUSAND/ÂΜL (ref 0–0.61)
EOSINOPHIL NFR BLD AUTO: 2 % (ref 0–6)
ERYTHROCYTE [DISTWIDTH] IN BLOOD BY AUTOMATED COUNT: 12.5 % (ref 11.6–15.1)
GFR SERPL CREATININE-BSD FRML MDRD: 78 ML/MIN/1.73SQ M
GLUCOSE P FAST SERPL-MCNC: 88 MG/DL (ref 65–99)
HCT VFR BLD AUTO: 45.6 % (ref 36.5–49.3)
HDLC SERPL-MCNC: 39 MG/DL
HGB BLD-MCNC: 15.8 G/DL (ref 12–17)
IMM GRANULOCYTES # BLD AUTO: 0.02 THOUSAND/UL (ref 0–0.2)
IMM GRANULOCYTES NFR BLD AUTO: 0 % (ref 0–2)
LDLC SERPL CALC-MCNC: 147 MG/DL (ref 0–100)
LYMPHOCYTES # BLD AUTO: 0.93 THOUSANDS/ÂΜL (ref 0.6–4.47)
LYMPHOCYTES NFR BLD AUTO: 20 % (ref 14–44)
MCH RBC QN AUTO: 30.3 PG (ref 26.8–34.3)
MCHC RBC AUTO-ENTMCNC: 34.6 G/DL (ref 31.4–37.4)
MCV RBC AUTO: 88 FL (ref 82–98)
MONOCYTES # BLD AUTO: 0.42 THOUSAND/ÂΜL (ref 0.17–1.22)
MONOCYTES NFR BLD AUTO: 9 % (ref 4–12)
NEUTROPHILS # BLD AUTO: 3.08 THOUSANDS/ÂΜL (ref 1.85–7.62)
NEUTS SEG NFR BLD AUTO: 68 % (ref 43–75)
NRBC BLD AUTO-RTO: 0 /100 WBCS
PLATELET # BLD AUTO: 243 THOUSANDS/UL (ref 149–390)
PMV BLD AUTO: 10.1 FL (ref 8.9–12.7)
POTASSIUM SERPL-SCNC: 4.4 MMOL/L (ref 3.5–5.3)
PROT SERPL-MCNC: 7.1 G/DL (ref 6.4–8.4)
RBC # BLD AUTO: 5.21 MILLION/UL (ref 3.88–5.62)
SODIUM SERPL-SCNC: 139 MMOL/L (ref 135–147)
TRIGL SERPL-MCNC: 314 MG/DL
TSH SERPL DL<=0.05 MIU/L-ACNC: 2.35 UIU/ML (ref 0.45–4.5)
WBC # BLD AUTO: 4.59 THOUSAND/UL (ref 4.31–10.16)

## 2023-11-29 PROCEDURE — 85025 COMPLETE CBC W/AUTO DIFF WBC: CPT

## 2023-11-29 PROCEDURE — 84443 ASSAY THYROID STIM HORMONE: CPT

## 2023-11-29 PROCEDURE — 80053 COMPREHEN METABOLIC PANEL: CPT

## 2023-11-29 PROCEDURE — 36415 COLL VENOUS BLD VENIPUNCTURE: CPT

## 2023-11-29 PROCEDURE — 80061 LIPID PANEL: CPT

## 2023-12-01 NOTE — RESULT ENCOUNTER NOTE
Spoke to pt, feeling improved. Labs okay except cholesterol high. Reviewed risk factors which include family history of coronary artery disease. Continue to work on diet and exercise.   At this point consider statin if not changed within the next year

## 2024-02-16 DIAGNOSIS — M62.838 MUSCLE SPASM: Primary | ICD-10-CM

## 2024-02-16 RX ORDER — METHOCARBAMOL 750 MG/1
750 TABLET, FILM COATED ORAL EVERY 6 HOURS PRN
Qty: 30 TABLET | Refills: 0 | Status: SHIPPED | OUTPATIENT
Start: 2024-02-16

## 2024-10-10 ENCOUNTER — TELEPHONE (OUTPATIENT)
Dept: FAMILY MEDICINE CLINIC | Facility: CLINIC | Age: 59
End: 2024-10-10

## 2024-10-10 DIAGNOSIS — D72.819 LEUKOPENIA, UNSPECIFIED TYPE: ICD-10-CM

## 2024-10-10 DIAGNOSIS — E78.5 HYPERLIPIDEMIA, UNSPECIFIED HYPERLIPIDEMIA TYPE: Primary | ICD-10-CM

## 2024-10-10 DIAGNOSIS — R17 ELEVATED BILIRUBIN: ICD-10-CM

## 2024-10-11 ENCOUNTER — RA CDI HCC (OUTPATIENT)
Dept: OTHER | Facility: HOSPITAL | Age: 59
End: 2024-10-11

## 2024-10-16 ENCOUNTER — APPOINTMENT (OUTPATIENT)
Age: 59
End: 2024-10-16
Payer: COMMERCIAL

## 2024-10-16 DIAGNOSIS — R17 ELEVATED BILIRUBIN: ICD-10-CM

## 2024-10-16 DIAGNOSIS — D72.819 LEUKOPENIA, UNSPECIFIED TYPE: ICD-10-CM

## 2024-10-16 DIAGNOSIS — E78.5 HYPERLIPIDEMIA, UNSPECIFIED HYPERLIPIDEMIA TYPE: ICD-10-CM

## 2024-10-16 LAB
ALBUMIN SERPL BCG-MCNC: 4.5 G/DL (ref 3.5–5)
ALP SERPL-CCNC: 33 U/L (ref 34–104)
ALT SERPL W P-5'-P-CCNC: 21 U/L (ref 7–52)
ANION GAP SERPL CALCULATED.3IONS-SCNC: 9 MMOL/L (ref 4–13)
AST SERPL W P-5'-P-CCNC: 14 U/L (ref 13–39)
BASOPHILS # BLD AUTO: 0.06 THOUSANDS/ΜL (ref 0–0.1)
BASOPHILS NFR BLD AUTO: 1 % (ref 0–1)
BILIRUB SERPL-MCNC: 1.29 MG/DL (ref 0.2–1)
BUN SERPL-MCNC: 16 MG/DL (ref 5–25)
CALCIUM SERPL-MCNC: 9.3 MG/DL (ref 8.4–10.2)
CHLORIDE SERPL-SCNC: 104 MMOL/L (ref 96–108)
CHOLEST SERPL-MCNC: 268 MG/DL
CO2 SERPL-SCNC: 28 MMOL/L (ref 21–32)
CREAT SERPL-MCNC: 0.98 MG/DL (ref 0.6–1.3)
EOSINOPHIL # BLD AUTO: 0.08 THOUSAND/ΜL (ref 0–0.61)
EOSINOPHIL NFR BLD AUTO: 2 % (ref 0–6)
ERYTHROCYTE [DISTWIDTH] IN BLOOD BY AUTOMATED COUNT: 13.2 % (ref 11.6–15.1)
GFR SERPL CREATININE-BSD FRML MDRD: 85 ML/MIN/1.73SQ M
GLUCOSE P FAST SERPL-MCNC: 90 MG/DL (ref 65–99)
HCT VFR BLD AUTO: 49.7 % (ref 36.5–49.3)
HDLC SERPL-MCNC: 47 MG/DL
HGB BLD-MCNC: 16.3 G/DL (ref 12–17)
IMM GRANULOCYTES # BLD AUTO: 0.02 THOUSAND/UL (ref 0–0.2)
IMM GRANULOCYTES NFR BLD AUTO: 0 % (ref 0–2)
LDLC SERPL CALC-MCNC: 188 MG/DL (ref 0–100)
LYMPHOCYTES # BLD AUTO: 0.86 THOUSANDS/ΜL (ref 0.6–4.47)
LYMPHOCYTES NFR BLD AUTO: 17 % (ref 14–44)
MCH RBC QN AUTO: 29.3 PG (ref 26.8–34.3)
MCHC RBC AUTO-ENTMCNC: 32.8 G/DL (ref 31.4–37.4)
MCV RBC AUTO: 89 FL (ref 82–98)
MONOCYTES # BLD AUTO: 0.37 THOUSAND/ΜL (ref 0.17–1.22)
MONOCYTES NFR BLD AUTO: 7 % (ref 4–12)
NEUTROPHILS # BLD AUTO: 3.67 THOUSANDS/ΜL (ref 1.85–7.62)
NEUTS SEG NFR BLD AUTO: 73 % (ref 43–75)
NRBC BLD AUTO-RTO: 0 /100 WBCS
PLATELET # BLD AUTO: 198 THOUSANDS/UL (ref 149–390)
PMV BLD AUTO: 9.9 FL (ref 8.9–12.7)
POTASSIUM SERPL-SCNC: 4.3 MMOL/L (ref 3.5–5.3)
PROT SERPL-MCNC: 7.3 G/DL (ref 6.4–8.4)
RBC # BLD AUTO: 5.57 MILLION/UL (ref 3.88–5.62)
SODIUM SERPL-SCNC: 141 MMOL/L (ref 135–147)
TRIGL SERPL-MCNC: 164 MG/DL
WBC # BLD AUTO: 5.06 THOUSAND/UL (ref 4.31–10.16)

## 2024-10-16 PROCEDURE — 85025 COMPLETE CBC W/AUTO DIFF WBC: CPT

## 2024-10-16 PROCEDURE — 80053 COMPREHEN METABOLIC PANEL: CPT

## 2024-10-16 PROCEDURE — 36415 COLL VENOUS BLD VENIPUNCTURE: CPT

## 2024-10-16 PROCEDURE — 80061 LIPID PANEL: CPT

## 2024-10-18 ENCOUNTER — OFFICE VISIT (OUTPATIENT)
Dept: FAMILY MEDICINE CLINIC | Facility: CLINIC | Age: 59
End: 2024-10-18
Payer: COMMERCIAL

## 2024-10-18 ENCOUNTER — APPOINTMENT (OUTPATIENT)
Age: 59
End: 2024-10-18
Payer: COMMERCIAL

## 2024-10-18 VITALS
HEART RATE: 74 BPM | BODY MASS INDEX: 32.28 KG/M2 | SYSTOLIC BLOOD PRESSURE: 116 MMHG | OXYGEN SATURATION: 95 % | TEMPERATURE: 97.1 F | HEIGHT: 75 IN | DIASTOLIC BLOOD PRESSURE: 84 MMHG | WEIGHT: 259.6 LBS

## 2024-10-18 DIAGNOSIS — Z23 ENCOUNTER FOR IMMUNIZATION: ICD-10-CM

## 2024-10-18 DIAGNOSIS — E78.2 MIXED HYPERLIPIDEMIA: ICD-10-CM

## 2024-10-18 DIAGNOSIS — G35 MULTIPLE SCLEROSIS (HCC): ICD-10-CM

## 2024-10-18 DIAGNOSIS — Z00.00 WELL ADULT EXAM: Primary | ICD-10-CM

## 2024-10-18 DIAGNOSIS — Z12.11 SCREENING FOR COLON CANCER: ICD-10-CM

## 2024-10-18 DIAGNOSIS — Z12.5 SCREENING FOR MALIGNANT NEOPLASM OF PROSTATE: ICD-10-CM

## 2024-10-18 PROCEDURE — 90471 IMMUNIZATION ADMIN: CPT | Performed by: NURSE PRACTITIONER

## 2024-10-18 PROCEDURE — G0103 PSA SCREENING: HCPCS

## 2024-10-18 PROCEDURE — 90673 RIV3 VACCINE NO PRESERV IM: CPT | Performed by: NURSE PRACTITIONER

## 2024-10-18 PROCEDURE — 99396 PREV VISIT EST AGE 40-64: CPT | Performed by: NURSE PRACTITIONER

## 2024-10-18 PROCEDURE — 36415 COLL VENOUS BLD VENIPUNCTURE: CPT

## 2024-10-18 NOTE — PROGRESS NOTES
Ambulatory Visit  Name: Luis Armando Engel      : 1965      MRN: [id number]  Encounter Provider: DREW Simpson  Encounter Date: 10/18/2024   Encounter department: [facility]    Assessment & Plan  Well adult exam  Obtain colon record       Encounter for immunization    Orders:    influenza vaccine, recombinant, PF, 0.5 mL IM (Flublok)    Screening for colon cancer         Multiple sclerosis (HCC)  In remission       Screening for malignant neoplasm of prostate    Orders:    PSA Total (Reflex To Free); Future    Mixed hyperlipidemia  Discussed pros and cons of statin therapy I really think he should consider it given family history. We discussed how statins are more effective than supplements in cardiac risk reduction.  We discussed options such as sitting down with a cardiologist or getting a CT calcium score he will think about these options          History of Present Illness     Patient is here for his well visit.  States doing well no complaints.  We do review family history and he does have a brother with a stent and he is a father who had coronary artery disease.  He recently started being more active it they got a dog a few months ago so he has been walking that and also started back with playing basketball with friends.  States it was a little area at first but he is doing well now.  Denies any chest pain shortness of breath or syncope with exercise.  Not a smoker and not on blood pressure medication.  Denies problem with mood.  Cholesterol is high and it has been high for some time.  He does not really like to take medication.  He does not eat a particularly nutritious diet historically but he is trying to add more broccoli and spinach than he used to.  Has a history of MS but it has been in remission and he has been off meds for several years without an issue.  Neurologist took him off during COVID and he really has not have any sort of flareup.  He is having issue with  "more floaters but he is following with ophthalmology for that.  Also symptoms of dry  Did order screening labs but I did forget to include a PSA so I like to that today.  Apparently had a dad who passed away from prostate cancer.  He believes he had a colonoscopy about 7 years ago            Review of Systems        Objective     /84 (BP Location: Left arm, Patient Position: Sitting, Cuff Size: Large)   Pulse 74   Temp (!) 97.1 °F (36.2 °C) (Tympanic)   Ht 6' 3\" (1.905 m)   Wt 118 kg (259 lb 9.6 oz)   SpO2 95%   BMI 32.45 kg/m²     Physical Exam  Cardiovascular:      Rate and Rhythm: Normal rate and regular rhythm.   Pulmonary:      Effort: Pulmonary effort is normal.      Breath sounds: Normal breath sounds.   Abdominal:      Palpations: Abdomen is soft.      Tenderness: There is no abdominal tenderness.   Musculoskeletal:      Cervical back: Normal range of motion.   Neurological:      Mental Status: He is alert.         "

## 2024-10-19 LAB — PSA SERPL-MCNC: 0.84 NG/ML (ref 0–4)

## 2024-10-22 DIAGNOSIS — E78.5 HYPERLIPIDEMIA, UNSPECIFIED HYPERLIPIDEMIA TYPE: Primary | ICD-10-CM

## 2024-10-30 ENCOUNTER — APPOINTMENT (OUTPATIENT)
Age: 59
End: 2024-10-30
Payer: COMMERCIAL

## 2024-10-30 ENCOUNTER — TELEPHONE (OUTPATIENT)
Dept: ADMINISTRATIVE | Facility: OTHER | Age: 59
End: 2024-10-30

## 2024-10-30 ENCOUNTER — OFFICE VISIT (OUTPATIENT)
Dept: FAMILY MEDICINE CLINIC | Facility: CLINIC | Age: 59
End: 2024-10-30
Payer: COMMERCIAL

## 2024-10-30 ENCOUNTER — HOSPITAL ENCOUNTER (OUTPATIENT)
Facility: MEDICAL CENTER | Age: 59
Discharge: HOME | End: 2024-10-30
Payer: COMMERCIAL

## 2024-10-30 VITALS
BODY MASS INDEX: 32.33 KG/M2 | WEIGHT: 260 LBS | SYSTOLIC BLOOD PRESSURE: 122 MMHG | DIASTOLIC BLOOD PRESSURE: 78 MMHG | HEART RATE: 89 BPM | HEIGHT: 75 IN | TEMPERATURE: 97.6 F | OXYGEN SATURATION: 98 %

## 2024-10-30 DIAGNOSIS — R20.0 NUMBNESS AND TINGLING OF LEFT LEG: ICD-10-CM

## 2024-10-30 DIAGNOSIS — Z11.4 SCREENING FOR HIV (HUMAN IMMUNODEFICIENCY VIRUS): ICD-10-CM

## 2024-10-30 DIAGNOSIS — G35 MULTIPLE SCLEROSIS (HCC): ICD-10-CM

## 2024-10-30 DIAGNOSIS — R20.2 NUMBNESS AND TINGLING OF LEFT LEG: Primary | ICD-10-CM

## 2024-10-30 DIAGNOSIS — R20.0 NUMBNESS AND TINGLING OF LEFT LEG: Primary | ICD-10-CM

## 2024-10-30 DIAGNOSIS — R20.2 NUMBNESS AND TINGLING OF LEFT LEG: ICD-10-CM

## 2024-10-30 DIAGNOSIS — E78.2 MIXED HYPERLIPIDEMIA: ICD-10-CM

## 2024-10-30 LAB
B BURGDOR IGG+IGM SER QL IA: NEGATIVE
CRP SERPL QL: 1.1 MG/L
HIV 1+2 AB+HIV1 P24 AG SERPL QL IA: NORMAL
HIV 2 AB SERPL QL IA: NORMAL
HIV1 AB SERPL QL IA: NORMAL
HIV1 P24 AG SERPL QL IA: NORMAL
VIT B12 SERPL-MCNC: 717 PG/ML (ref 180–914)

## 2024-10-30 PROCEDURE — 87389 HIV-1 AG W/HIV-1&-2 AB AG IA: CPT

## 2024-10-30 PROCEDURE — 82607 VITAMIN B-12: CPT

## 2024-10-30 PROCEDURE — 86140 C-REACTIVE PROTEIN: CPT

## 2024-10-30 PROCEDURE — 36415 COLL VENOUS BLD VENIPUNCTURE: CPT

## 2024-10-30 PROCEDURE — 86618 LYME DISEASE ANTIBODY: CPT

## 2024-10-30 PROCEDURE — 96372 THER/PROPH/DIAG INJ SC/IM: CPT | Performed by: FAMILY MEDICINE

## 2024-10-30 PROCEDURE — 99213 OFFICE O/P EST LOW 20 MIN: CPT | Performed by: FAMILY MEDICINE

## 2024-10-30 PROCEDURE — 70553 MRI BRAIN STEM W/O & W/DYE: CPT

## 2024-10-30 PROCEDURE — A9585 GADOBUTROL INJECTION: HCPCS | Performed by: NURSE PRACTITIONER

## 2024-10-30 RX ORDER — DEXAMETHASONE SODIUM PHOSPHATE 10 MG/ML
10 INJECTION INTRAMUSCULAR; INTRAVENOUS ONCE
Status: COMPLETED | OUTPATIENT
Start: 2024-10-30 | End: 2024-10-30

## 2024-10-30 RX ORDER — GADOBUTROL 604.72 MG/ML
10 INJECTION INTRAVENOUS
Status: COMPLETED | OUTPATIENT
Start: 2024-10-30 | End: 2024-10-30

## 2024-10-30 RX ORDER — PREDNISONE 10 MG/1
TABLET ORAL
Qty: 40 TABLET | Refills: 0 | Status: SHIPPED | OUTPATIENT
Start: 2024-10-30

## 2024-10-30 RX ADMIN — DEXAMETHASONE SODIUM PHOSPHATE 10 MG: 10 INJECTION INTRAMUSCULAR; INTRAVENOUS at 10:27

## 2024-10-30 RX ADMIN — GADOBUTROL 10 ML: 604.72 INJECTION INTRAVENOUS at 16:24

## 2024-10-30 NOTE — PROGRESS NOTES
Ambulatory Visit  Name: Luis Armando Engel      : 1965      MRN: [id number]  Encounter Provider: Rosario Dodge MD  Encounter Date: 10/30/2024   Encounter department: [facility]    Assessment & Plan  Numbness and tingling of left leg  Likely etiology is due to recent back trauma however must consider laceration of MS therefore we will begin with an MRI of his brain.  For the labs to be obtained.  Will treat with IM Decadron and prednisone until results available.   If all negative and symptoms persist then will need further workup back as well as questionable nerve conduction test  Orders:    MRI brain w wo contrast; Future    Lyme Total AB W Reflex to IGM/IGG; Future    C-reactive protein; Future    Vitamin B12; Future    dexamethasone (DECADRON) injection 10 mg    predniSONE 10 mg tablet; Take 4 daily for 4 days, then 3 daily for 4 days, then 2 daily for 4 days, then 1 daily for 4 days. Take with food.    Multiple sclerosis (HCC)  As above       Mixed hyperlipidemia  Recent labs reviewed.  Patient does have a family history of coronary artery disease therefore diet discussed at length and need to follow closely.  If levels do not improve will need to discuss possible trial of medication       Screening for HIV (human immunodeficiency virus)    Orders:    HIV 1/2 AG/AB w Reflex [facility] for 2 yr old and above; Future       History of Present Illness     Had his annual flu VAX on .  He has a history of ocular migraines which had been pretty silent for quite some time to after the flu vaccine at which time he has had several episodes since then 1 with a severe headache lasting for almost a week.  Approximately 1 week ago while playing basketball he was hit in the left side by another player with some pain however without any bruising or swelling.  Symptoms resolved quickly however over the past 6 days he now notices numbness over his left lateral hip into his left lateral and  "anterior thigh worse with movement of his leg however not present with weightbearing or ambulation not present at rest or when sleeping.  Thigh at 1 point felt like his pet was\" peeing on me\" he has tried Advil and Aleve without relief of symptoms.  He denies any associated rash and denies any recent insect or tick bites.  He denies any back pain to that he had with diagnosed \"ski [place]\" in the past.  Weakness of the leg.  He denies any other focal neurological symptoms at all.  He was first diagnosed with MS he had diplopia numbness of right leg and weakness of left arm.  His last MRI was 3 years ago which was stable and at that time neurologist felt he required no further evaluation or treatment.          Review of Systems        Objective     /78 (BP Location: Left arm, Patient Position: Sitting, Cuff Size: Large)   Pulse 89   Temp 97.6 °F (36.4 °C) (Tympanic)   Ht 6' 3\" (1.905 m)   Wt 118 kg (260 lb)   SpO2 98%   BMI 32.50 kg/m²     Physical Exam  Vitals and nursing note reviewed.   Constitutional:       General: He is not in acute distress.     Appearance: Normal appearance. He is well-developed.   HENT:      Head: Normocephalic and atraumatic.   Eyes:      Conjunctiva/sclera: Conjunctivae normal.   Neck:      Vascular: No carotid bruit.   Cardiovascular:      Rate and Rhythm: Normal rate and regular rhythm.      Pulses: Normal pulses.      Heart sounds: Normal heart sounds. No murmur heard.  Pulmonary:      Effort: Pulmonary effort is normal. No respiratory distress.      Breath sounds: Normal breath sounds.   Abdominal:      Palpations: Abdomen is soft.      Tenderness: There is no abdominal tenderness.   Musculoskeletal:         General: No swelling.      Cervical back: Neck supple.      Comments: No L-s Spine or bilat S-I joint or sciatic notch TTP   Lymphadenopathy:      Cervical: No cervical adenopathy.   Skin:     General: Skin is warm and dry.      Capillary Refill: Capillary refill takes " less than 2 seconds.   Neurological:      Mental Status: He is alert and oriented to person, place, and time.      Cranial Nerves: Cranial nerves 2-12 are intact.      Motor: Motor function is intact.      Coordination: Coordination is intact.      Gait: Gait is intact.      Deep Tendon Reflexes:      Reflex Scores:       Tricep reflexes are 1+ on the right side and 1+ on the left side.       Bicep reflexes are 1+ on the right side and 1+ on the left side.       Brachioradialis reflexes are 1+ on the right side and 1+ on the left side.       Patellar reflexes are 1+ on the right side and 1+ on the left side.       Achilles reflexes are 1+ on the right side and 1+ on the left side.     Comments: EOMI  no nystagmus  no hand drift  good f-n bilat   Psychiatric:         Mood and Affect: Mood normal.

## 2024-10-30 NOTE — ASSESSMENT & PLAN NOTE
Recent labs reviewed.  Patient does have a family history of coronary artery disease therefore diet discussed at length and need to follow closely.  If levels do not improve will need to discuss possible trial of medication

## 2024-10-30 NOTE — TELEPHONE ENCOUNTER
Upon review of the In Basket request we were able to locate, review, and update the patient chart as requested for CRC: Colonoscopy.    Any additional questions or concerns should be emailed to the Practice Liaisons via the appropriate education email address, please do not reply via In Basket.    Thank you  David Chan MA   [facility]

## 2024-10-30 NOTE — TELEPHONE ENCOUNTER
----- Message from Isidra PEDRO sent at 10/29/2024  1:11 PM EDT -----  Regarding: colonoscopy  10/29/24 1:12 PM    Hello, our patient Luis Armando Engel has had CRC: Colonoscopy completed/performed. Please assist in updating the patient chart by pulling the document from the Media Tab. The date of service is 05/18/2017.     Thank you,  ARIAN Jang [facility]

## 2024-11-04 DIAGNOSIS — R20.0 NUMBNESS AND TINGLING OF LEFT LEG: Primary | ICD-10-CM

## 2024-11-04 DIAGNOSIS — R20.2 NUMBNESS AND TINGLING OF LEFT LEG: Primary | ICD-10-CM

## 2024-11-04 DIAGNOSIS — G35 MULTIPLE SCLEROSIS (HCC): ICD-10-CM

## 2024-11-05 ENCOUNTER — OFFICE VISIT (OUTPATIENT)
Dept: NEUROLOGY | Facility: CLINIC | Age: 59
End: 2024-11-05
Payer: COMMERCIAL

## 2024-11-05 VITALS
BODY MASS INDEX: 31.56 KG/M2 | HEART RATE: 103 BPM | DIASTOLIC BLOOD PRESSURE: 82 MMHG | WEIGHT: 253.8 LBS | OXYGEN SATURATION: 99 % | TEMPERATURE: 97.4 F | SYSTOLIC BLOOD PRESSURE: 128 MMHG | HEIGHT: 75 IN

## 2024-11-05 DIAGNOSIS — G35 MULTIPLE SCLEROSIS (HCC): ICD-10-CM

## 2024-11-05 DIAGNOSIS — R20.0 NUMBNESS AND TINGLING OF LEFT LEG: ICD-10-CM

## 2024-11-05 DIAGNOSIS — R20.2 NUMBNESS AND TINGLING OF LEFT LEG: ICD-10-CM

## 2024-11-05 PROCEDURE — 99204 OFFICE O/P NEW MOD 45 MIN: CPT | Performed by: NURSE PRACTITIONER

## 2024-11-05 NOTE — PROGRESS NOTES
Neurology Ambulatory Visit  Name: Luis Armando Engel       : 1965       MRN: [id number]   Encounter Provider: Bernardo Figueroa MA   Encounter Date: 2024  Encounter department: [facility]    Assessment and Plan  1. Numbness and tingling of left leg  -     Ambulatory Referral to Neurology  -     MRI cervical spine with and without contrast; Future; Expected date: 2024  -     MRI thoracic spine with and without contrast; Future; Expected date: 2024  2. Multiple sclerosis (HCC)  -     Ambulatory Referral to Neurology  -     MRI cervical spine with and without contrast; Future; Expected date: 2024  -     MRI thoracic spine with and without contrast; Future; Expected date: 2024    Patient Instructions/Plan:  MRI stat (concern for repeat spinal lesion)- I will be in touch after  Follow up with Ananya in 8 weeks    He will No follow-ups on file.    History of Present Illness     HPI   Luis Armando Engel is a 59 y.o. male with past medical history of MS, migraine, hyperlipidemia who presents for new patient evaluation of recent LLE numbness/tingling sensation.- reviewed recent primary care note 10/30/2024 where labs were ordered/MRI brain ordered and steroids were given for 1 week history of symptoms after being hit in back during a basketball game. He states the abnormal sensation to the leg is not overly painful and does not cause any weakness but it is the entire leg.     He saw Dr. Carson when younger; he presented with left arm weakness and diplopia after a [employer] concert; he had MRI at that time and there was question of MS but no formal diagnosis (thought maybe episode of encephalitis? Per his report); he recovered from this and did not have any recurrence of events until 2018 when he presented to [facility] neurology with right thigh numbness and MRI T spine showed left T6 enhancement/lesion and he had LP which showed protein 63, ocb csf 8 and 0 serum bands  [facility]    History and Physical - Hospitalist Service       Date of Admission:  2/25/2022    Assessment & Plan          Masoud Huddleston is a 38 year old male admitted on 2/25/2022.  He has  h/o alcoholic cirrhosis and esophageal varices, ongoing chronic alcohol dependency and abuse. Patient relapsed with drinking alcohol last march 2021. He has been drinking 1 liter of vodka daily. His last drink was on Wednesday evening. He has been admitted with hemetemesis.  He had multiple episodes of vomiting. Will monitor hb and transfuse prn to keep hb > 7-8. Consult GI, on iv protonix, octreotide infusion. Plan for endoscopic evaluation once patient more clinically stable. Will keep him on CIWA protocol. CT abdomen/pelvis : Cirrhosis, moderate to severe steatosis and moderate hepatomegaly compatible with alcoholic liver disease and portal to caval collateral vein formation as detailed above consistent with portal hypertension.            A/p :         GI bleed/ hemetemesis, melena : ? Esophageal varices, ? Acid peptic disorder, ? Aiyana aleman tear.  Will monitor hb and transfuse prn to keep hb > 7-8. Consult GI, on iv protonix, octreotide infusion. Plan for endoscopic evaluation once patient more clinically stable.       Anemia, acute blood loss : 2/2 GI bleed, monitor, transfuse prn to keep hb >7-8.      Alcohol w/d : on CIWA protocol      Chronic alcohol dependence : on CIWA protcol.      Alcoholic cirrhosis with h/o esophageal varices : on lasix 40 mg bid      Hypokalemia, mild : supplement prn      Hypomagnesemia : supplement prn      Lactic acidosis : unlikely sepsis, related to CLD and dehydration, iv fluid resuscitation, monitor.      Abnormal LFT's : 2/2 chronic alcoholic liver disease.      Leucocytosis : ? Reactive, monitor.      Thrombocytopenia : 2/2 chronic liver disease, monitor.       Anxiety/Depression : on remeron 15 mg at bedtime, gabapentin 300 mg tid      Prolonged QTc :  and Igg index CSF 1.14; JCV negative). He was started on tecfidera and continued this until he stopped in January of 2021 and has not followed up with Dr. Jules since. He lives with his spouse; he is independent in adl's/iadl's; he works for [employer].      MRI brain 12/4/2017:  A single prominent hyperintense T2 signal lesion in right temporal white matter   adjacent to the temporal half of right lateral ventricle, and nonspecific with   differential diagnosis as discussed above. Clinical correlation recommended     MRI Brain 4/20/2021;  Brain MRI:   1. Unchanged T2/FLAIR hyperintense lesions as detailed above. Consistent with   the provided medical history of multiple sclerosis.   2. No acute intracranial abnormality. Specifically, no acute infarction, acute   intracranial hemorrhage, intracranial mass or mass effect.  High Resolution Imaging of the Internal Auditory Canals:   1. No evidence of vestibular schwannoma.   2. Inner structures are normal. No abnormal enhancement.       MRI brain 10/30/2024:  IMPRESSION:  FLAIR and T2 hyperintense lesions are noted and compatible with the provided history of multiple sclerosis. No prior studies are available for direct comparison. No enhancing lesions are identified.    MRI C spine/T spine 12/18/2018:  Stable multilevel degenerative changes. No abnormal cord signal or enhancement.   Decrease in size of small T6 cord lesion with resolution of enhancement. No new   abnormal cord signal or enhancement.       Review of Systems   Constitutional: Negative.    HENT: Negative.     Eyes: Negative.    Respiratory: Negative.     Cardiovascular: Negative.    Gastrointestinal: Negative.    Endocrine: Negative.    Genitourinary: Negative.    Musculoskeletal: Negative.    Skin: Negative.    Allergic/Immunologic: Negative.    Neurological:  Positive for numbness (and tingling of left leg - started approx 5 - 6 days ago).   Hematological: Negative.    Psychiatric/Behavioral:  monitor, avoid QTc prolonging medications.               Diet:   clear liquid  DVT Prophylaxis: Pneumatic Compression Devices  Casanova Catheter: Not present  Central Lines: None  Cardiac Monitoring: None  Code Status:   full    Clinically Significant Risk Factors Present on Admission           # Hypomagnesemia: Mg = 1.0 mg/dL (Ref range: 1.8 - 2.6 mg/dL) on admission, will replace as needed  # Anion Gap Metabolic Acidosis: AG = 35 mmol/L (Ref range: 5 - 18 mmol/L) on admission, will monitor and treat as appropriate   # Coagulation Defect: INR = 1.33 (Ref range: 0.85 - 1.15) and/or PTT = 34 Seconds (Ref range: 22 - 38 Seconds) on admission, will monitor for bleeding  # Thrombocytopenia: Plts = 90 10e3/uL (Ref range: 150 - 450 10e3/uL) on admission, will monitor for bleeding       Disposition Plan   Expected Discharge: 2 days ?  Anticipated discharge location: home    Delays: ongoing management of GI bleed       The patient's care was discussed with the Bedside Nurse and Patient.    Anastacio Monson MD  Hospitalist Service  [facility]  Securely message with the Vocera Web Console (learn more here)  Text page via [employer] Paging/Directory         ______________________________________________________________________    Chief Complaint   GI bleed - hemetemesis    History is obtained from the patient    History of Present Illness       Masoud Huddleston is a 38 year old male admitted on 2/25/2022.  He has  h/o alcoholic cirrhosis, ongoing chronic alcohol dependency and abuse. Patient relapsed with drinking alcohol last march 2021. He has been drinking 1 liter of vodka daily. His last drink was on Wednesday evening. He has been admitted with hemetemesis.  He had multiple episodes of vomiting. Will monitor hb and transfuse prn to keep hb > 7-8. Consult GI, on iv protonix, octreotide infusion. Plan for endoscopic evaluation once patient more clinically stable. Will keep him on CIWA  "Negative.     ROS was reviewed and updated as appropriate    Current Outpatient Medications on File Prior to Visit   Medication Sig Dispense Refill    predniSONE 10 mg tablet Take 4 daily for 4 days, then 3 daily for 4 days, then 2 daily for 4 days, then 1 daily for 4 days. Take with food. 40 tablet 0     No current facility-administered medications on file prior to visit.      Social History     Tobacco Use    Smoking status: Never     Passive exposure: Never    Smokeless tobacco: Never   Vaping Use    Vaping status: Never Used   Substance and Sexual Activity    Alcohol use: Yes    Drug use: Never    Sexual activity: Not on file       Objective     /82 (BP Location: Right arm, Patient Position: Sitting, Cuff Size: Standard)   Pulse 103   Temp (!) 97.4 °F (36.3 °C) (Temporal)   Ht 6' 3\" (1.905 m)   Wt 115 kg (253 lb 12.8 oz)   SpO2 99%   BMI 31.72 kg/m²    Physical Exam  Vitals reviewed.   Constitutional:       General: He is not in acute distress.     Appearance: He is obese.   HENT:      Head: Normocephalic and atraumatic.      Right Ear: External ear normal.      Left Ear: External ear normal.      Nose: Nose normal.      Mouth/Throat:      Mouth: Mucous membranes are moist.      Pharynx: Oropharynx is clear.   Eyes:      General:         Right eye: No discharge.         Left eye: No discharge.      Extraocular Movements: Extraocular movements intact.      Pupils: Pupils are equal, round, and reactive to light.   Cardiovascular:      Rate and Rhythm: Normal rate and regular rhythm.      Pulses: Normal pulses.      Heart sounds: Normal heart sounds.   Pulmonary:      Effort: Pulmonary effort is normal.      Breath sounds: Normal breath sounds.   Abdominal:      General: There is no distension.      Tenderness: There is no abdominal tenderness.   Musculoskeletal:      Cervical back: Normal range of motion.      Right lower leg: No edema.      Left lower leg: No edema.   Skin:     General: Skin is warm. " protocol.         Review of Systems      No fever or chills  No cp, cough or phlegm or sob  No abdominal pain  No urinary symptoms  No neuro symptoms    Past Medical History    I have reviewed this patient's medical history and updated it with pertinent information if needed.   Past Medical History:   Diagnosis Date     Acute alcoholic intoxication in alcoholism with complication (H)      Acute metabolic encephalopathy      Alcohol abuse      Alcoholic cirrhosis of liver without ascites (H)      Alcoholic ketoacidosis 9/19/2019     Chronic acquired lymphedema      Cirrhosis of liver (H)      Depression      ED (erectile dysfunction)      Elevated liver enzymes 4/27/2018     Esophageal varices without bleeding (H)      GI (gastrointestinal bleed)     hematemesis     Hematemesis 4/25/2018    Added automatically from request for surgery [id number]     History of transfusion      Hypokalemia 9/19/2019     Hypomagnesemia 4/27/2018     Liver disease      Aiyana-Vizcaino tear 4/27/2018     Nausea with vomiting      Neuropathy      Right patella fracture      Seizures (H)     withdrawal     Substance abuse (H)      Thrombocytopenia (H)        Past Surgical History   I have reviewed this patient's surgical history and updated it with pertinent information if needed.  Past Surgical History:   Procedure Laterality Date     ESOPHAGOSCOPY, GASTROSCOPY, DUODENOSCOPY (EGD), COMBINED N/A 4/25/2018    Procedure: ESOPHAGOGASTRODUODENOSCOPY (EGD);  Surgeon: Lora Valencia MD;  Location: [facility] GI;  Service:      HC OPEN RX PATELLA FX Right 4/28/2018    Procedure: OPEN REDUCTION INTERNAL FIXATION, FRACTURE, PATELLA;  Surgeon: Bertram Beltran MD;  Location: [facility];  Service: Orthopedics     KNEE SURGERY Right 2018     AL ESOPHAGOGASTRODUODENOSCOPY TRANSORAL DIAGNOSTIC N/A 5/3/2020    Procedure: ESOPHAGOGASTRODUODENOSCOPY (EGD);  Surgeon: Ricardo Barbosa MD;  Location: [facility] OR;  Service: Gastroenterology           Capillary Refill: Capillary refill takes less than 2 seconds.      Findings: No rash.   Neurological:      Mental Status: He is alert.      Cranial Nerves: No cranial nerve deficit.      Sensory: Sensory deficit present.      Motor: Motor strength is normal.No weakness.      Coordination: Romberg sign negative. Coordination normal.      Gait: Gait normal.      Deep Tendon Reflexes:      Reflex Scores:       Bicep reflexes are 2+ on the right side and 2+ on the left side.       Brachioradialis reflexes are 2+ on the right side and 2+ on the left side.       Patellar reflexes are 1+ on the right side and 1+ on the left side.  Psychiatric:         Mood and Affect: Mood normal.         Speech: Speech normal.       Neurological Exam  Mental Status  Alert. Oriented to person, place and time. Speech is normal. Language is fluent with no aphasia. Fund of knowledge is appropriate for level of education.    Cranial Nerves  CN II: Visual acuity is normal.  CN III, IV, VI: Extraocular movements intact bilaterally. Pupils equal round and reactive to light bilaterally.  CN V: Facial sensation is normal.  CN VII: Full and symmetric facial movement.  CN VIII: Hearing is normal.  CN IX, X: Palate elevates symmetrically  CN XI: Shoulder shrug strength is normal.  CN XII: Tongue midline without atrophy or fasciculations.    Motor  Normal muscle bulk throughout. Strength is 5/5 throughout all four extremities.    Sensory  Light touch is normal in upper and lower extremities. Pinprick abnormality: Temperature is normal in upper and lower extremities. Vibration is normal in upper and lower extremities.   Decreased pinprick sensation to LLE compared to RLE-not in any dermatomal pattern.    Reflexes                                            Right                      Left  Brachioradialis                    2+                         2+  Biceps                                 2+                         2+  Patellar                      PARACENTESIS  1/16/2020      PARACENTESIS  1/23/2020       Social History   I have reviewed this patient's social history and updated it with pertinent information if needed.  Social History     Tobacco Use     Smoking status: Current Every Day Smoker     Packs/day: 1.00     Types: Cigarettes     Smokeless tobacco: Never Used   Substance Use Topics     Alcohol use: Yes     Comment: a liter of Vodka everyday     Drug use: Yes     Types: Marijuana       Family History     Non contributory    Prior to Admission Medications   Prior to Admission Medications   Prescriptions Last Dose Informant Patient Reported? Taking?   furosemide (LASIX) 40 MG tablet Past Week at Unknown time  No Yes   Sig: TAKE 1 TABLET BY MOUTH TWICE A DAY (09:00AM & 06:00PM)   gabapentin (NEURONTIN) 300 MG capsule Not Taking at Unknown time  No No   Sig: Take 1 capsule (300 mg) by mouth 3 times daily   Patient not taking: Reported on 2/25/2022   mirtazapine (REMERON) 15 MG tablet Past Week at Unknown time  No Yes   Sig: Take 1 tablet (15 mg) by mouth At Bedtime   thiamine (B-1) 100 MG tablet Not Taking at Unknown time  No No   Sig: Take 1 tablet (100 mg) by mouth daily   Patient not taking: Reported on 2/25/2022      Facility-Administered Medications: None     Allergies   No Known Allergies    Physical Exam   Vital Signs: Temp: 98.4  F (36.9  C) Temp src: Oral BP: 110/61 Pulse: 108   Resp: 16 SpO2: 98 % O2 Device: None (Room air)    Weight: 143 lbs 4.8 oz       GENERAL: The patient is not in any acute distressed. Awake and alert.  HEENT: Nonicteric sclerae, PERRLA, EOMI. Oropharynx clear. Moist mucous membranes. Conjunctivae appear well perfused.  HEART: Regular rate and rhythm without murmurs.  LUNGS: Clear to auscultation bilaterally. No wheezing or crackles.  ABDOMEN: Soft, positive bowel sounds, nontender.  SKIN: No rash, no excessive bruising, petechiae, or purpura.  EXTREMITIES : no rashes, no swelling in legs.  NEUROLOGIC: conscious and              1+                         1+    Coordination  Right: Rapid alternating movement normal.Left: Rapid alternating movement normal.    Gait   Normal gait.Casual gait is normal including stance, stride, and arm swing. Romberg is absent. Able to rise from chair without using arms.         oriented, follows commands, no obvious focal deficits.  ROS: All other systems negative '    Data   Data reviewed today: I reviewed all medications, new labs and imaging results over the last 24 hours. I personally reviewed no images or EKG's today.    Recent Labs   Lab 02/26/22  0007 02/25/22  1925 02/25/22  1654 02/25/22  1115   WBC  --   --   --  13.8*   HGB 9.6* 10.8* 11.0* 12.9*   MCV  --   --   --  96   PLT  --   --   --  90*   INR  --   --   --  1.33*   NA  --   --   --  137   POTASSIUM 2.7*  --   --  3.0*   CHLORIDE  --   --   --  83*   CO2  --   --   --  19*   BUN  --   --   --  31*   CR  --   --   --  1.12   ANIONGAP  --   --   --  35*   FÉLIX  --   --   --  8.6   GLC  --   --   --  134*   ALBUMIN  --   --   --  3.4*   PROTTOTAL  --   --   --  9.1*   BILITOTAL  --   --   --  6.4*   ALKPHOS  --   --   --  196*   ALT  --   --   --  28   AST  --   --   --  124*   LIPASE  --   --   --  16

## 2024-11-06 ENCOUNTER — HOSPITAL ENCOUNTER (OUTPATIENT)
Dept: MRI IMAGING | Facility: HOSPITAL | Age: 59
Discharge: HOME | End: 2024-11-06
Payer: COMMERCIAL

## 2024-11-06 DIAGNOSIS — R20.2 NUMBNESS AND TINGLING OF LEFT LEG: ICD-10-CM

## 2024-11-06 DIAGNOSIS — G35 MULTIPLE SCLEROSIS (HCC): ICD-10-CM

## 2024-11-06 DIAGNOSIS — R20.0 NUMBNESS AND TINGLING OF LEFT LEG: ICD-10-CM

## 2024-11-06 PROCEDURE — 72157 MRI CHEST SPINE W/O & W/DYE: CPT

## 2024-11-06 PROCEDURE — A9585 GADOBUTROL INJECTION: HCPCS | Performed by: NURSE PRACTITIONER

## 2024-11-06 PROCEDURE — 72156 MRI NECK SPINE W/O & W/DYE: CPT

## 2024-11-06 RX ORDER — GADOBUTROL 604.72 MG/ML
11 INJECTION INTRAVENOUS
Status: COMPLETED | OUTPATIENT
Start: 2024-11-06 | End: 2024-11-06

## 2024-11-06 RX ADMIN — GADOBUTROL 11 ML: 604.72 INJECTION INTRAVENOUS at 17:31

## 2024-11-07 ENCOUNTER — TELEPHONE (OUTPATIENT)
Age: 59
End: 2024-11-07

## 2024-11-07 DIAGNOSIS — G35 MULTIPLE SCLEROSIS (HCC): Primary | ICD-10-CM

## 2024-11-07 DIAGNOSIS — G35 MS (MULTIPLE SCLEROSIS) (HCC): Primary | ICD-10-CM

## 2024-11-07 RX ORDER — SODIUM CHLORIDE 9 MG/ML
20 INJECTION, SOLUTION INTRAVENOUS ONCE
Status: CANCELLED | OUTPATIENT
Start: 2024-11-08

## 2024-11-07 NOTE — TELEPHONE ENCOUNTER
Per communication ronald/Addy, please schedule urgent steroid infusions for patient who is in active MS relapse.    SC msg sent to Navigator Team    Addy - please advise how many days of Solumedrol infusion? 3? The navigator team will enter the therapy orders. Thank you!

## 2024-11-07 NOTE — TELEPHONE ENCOUNTER
Attempted to obtain auth via [employer]. Auth submitted ([id number]). Cancelled by [employer] due to no auth being needed by plan.     Therapy plan entered, sent to provider for review/signature

## 2024-11-07 NOTE — TELEPHONE ENCOUNTER
Pt called in , he said he has been waiting all day to come in for his infusion today, Please advise.

## 2024-11-08 ENCOUNTER — HOSPITAL ENCOUNTER (OUTPATIENT)
Dept: INFUSION CENTER | Facility: HOSPITAL | Age: 59
End: 2024-11-08
Attending: PSYCHIATRY & NEUROLOGY
Payer: COMMERCIAL

## 2024-11-08 ENCOUNTER — TELEPHONE (OUTPATIENT)
Dept: NEUROLOGY | Facility: CLINIC | Age: 59
End: 2024-11-08

## 2024-11-08 VITALS
TEMPERATURE: 97.5 F | HEART RATE: 70 BPM | DIASTOLIC BLOOD PRESSURE: 76 MMHG | RESPIRATION RATE: 17 BRPM | OXYGEN SATURATION: 96 % | SYSTOLIC BLOOD PRESSURE: 134 MMHG

## 2024-11-08 DIAGNOSIS — F41.9 ANXIETY: Primary | ICD-10-CM

## 2024-11-08 DIAGNOSIS — G35 MULTIPLE SCLEROSIS (HCC): Primary | ICD-10-CM

## 2024-11-08 PROCEDURE — 96365 THER/PROPH/DIAG IV INF INIT: CPT

## 2024-11-08 RX ORDER — SODIUM CHLORIDE 9 MG/ML
20 INJECTION, SOLUTION INTRAVENOUS ONCE
Status: CANCELLED | OUTPATIENT
Start: 2024-11-09

## 2024-11-08 RX ORDER — SODIUM CHLORIDE 9 MG/ML
20 INJECTION, SOLUTION INTRAVENOUS ONCE
Status: COMPLETED | OUTPATIENT
Start: 2024-11-08 | End: 2024-11-08

## 2024-11-08 RX ADMIN — SODIUM CHLORIDE 20 ML/HR: 9 INJECTION, SOLUTION INTRAVENOUS at 14:18

## 2024-11-08 RX ADMIN — SODIUM CHLORIDE 1000 MG: 0.9 INJECTION, SOLUTION INTRAVENOUS at 14:18

## 2024-11-08 NOTE — TELEPHONE ENCOUNTER
Called and spoke to pt. Moved appt to 11/22 (2 weeks from now) also informed him that Addy put in lab work that should be done 1 week before appt. Pt agreeable

## 2024-11-08 NOTE — PROGRESS NOTES
Luis Armando Engel  tolerated IV solumedrol well with no complications.      Luis Armando Engel is aware of future appt tomorrow at 9:30AM at [facility].    AVS declined by Luis Armando Engel.

## 2024-11-08 NOTE — TELEPHONE ENCOUNTER
----- Message from DREW Duval sent at 11/7/2024  8:34 AM EST -----  Please move up the appointment with Ananya Cifuentes; 2-3 weeks from now and let the patient know to get labs done 1 week prior to the appointment (I will put in labs now).  -Addy

## 2024-11-08 NOTE — TELEPHONE ENCOUNTER
Called [facility] and spoke with Brooke. Pt has already called to schedule.     Scheduled for the following:    Today 11/8/24 @ 2p - Brandon  Tomorrow 11/9/24 @ 9:30am - Nick  11/11/24 @ 1:50pm - Brandon  11/12/24 @ 2p - Brandon  11/13/24 @ 2p - Brandon

## 2024-11-09 ENCOUNTER — HOSPITAL ENCOUNTER (OUTPATIENT)
Dept: INFUSION CENTER | Facility: CLINIC | Age: 59
Discharge: HOME | End: 2024-11-09
Payer: COMMERCIAL

## 2024-11-09 VITALS
SYSTOLIC BLOOD PRESSURE: 160 MMHG | HEART RATE: 98 BPM | OXYGEN SATURATION: 98 % | RESPIRATION RATE: 18 BRPM | DIASTOLIC BLOOD PRESSURE: 87 MMHG | TEMPERATURE: 97.4 F

## 2024-11-09 DIAGNOSIS — G35 MULTIPLE SCLEROSIS (HCC): Primary | ICD-10-CM

## 2024-11-09 PROCEDURE — 96365 THER/PROPH/DIAG IV INF INIT: CPT

## 2024-11-09 RX ORDER — SODIUM CHLORIDE 9 MG/ML
20 INJECTION, SOLUTION INTRAVENOUS ONCE
Status: COMPLETED | OUTPATIENT
Start: 2024-11-09 | End: 2024-11-09

## 2024-11-09 RX ORDER — SODIUM CHLORIDE 9 MG/ML
20 INJECTION, SOLUTION INTRAVENOUS ONCE
Status: CANCELLED | OUTPATIENT
Start: 2024-11-10

## 2024-11-09 RX ADMIN — SODIUM CHLORIDE 1000 MG: 0.9 INJECTION, SOLUTION INTRAVENOUS at 09:26

## 2024-11-09 RX ADMIN — SODIUM CHLORIDE 20 ML/HR: 9 INJECTION, SOLUTION INTRAVENOUS at 09:27

## 2024-11-09 NOTE — PROGRESS NOTES
Patient tolerated treatment today without complications. Patient confirmed appointment for Monday 11/11 at 1350 at the [facility]. Print out declined.

## 2024-11-11 ENCOUNTER — HOSPITAL ENCOUNTER (OUTPATIENT)
Dept: INFUSION CENTER | Facility: HOSPITAL | Age: 59
Discharge: HOME | End: 2024-11-11
Attending: PSYCHIATRY & NEUROLOGY
Payer: COMMERCIAL

## 2024-11-11 VITALS
SYSTOLIC BLOOD PRESSURE: 121 MMHG | DIASTOLIC BLOOD PRESSURE: 76 MMHG | TEMPERATURE: 96.9 F | OXYGEN SATURATION: 96 % | HEART RATE: 103 BPM | RESPIRATION RATE: 17 BRPM

## 2024-11-11 DIAGNOSIS — G35 MULTIPLE SCLEROSIS (HCC): Primary | ICD-10-CM

## 2024-11-11 PROCEDURE — 96365 THER/PROPH/DIAG IV INF INIT: CPT

## 2024-11-11 RX ORDER — SODIUM CHLORIDE 9 MG/ML
20 INJECTION, SOLUTION INTRAVENOUS ONCE
Status: CANCELLED | OUTPATIENT
Start: 2024-11-12

## 2024-11-11 RX ORDER — SODIUM CHLORIDE 9 MG/ML
20 INJECTION, SOLUTION INTRAVENOUS ONCE
Status: COMPLETED | OUTPATIENT
Start: 2024-11-11 | End: 2024-11-11

## 2024-11-11 RX ADMIN — SODIUM CHLORIDE 1000 MG: 0.9 INJECTION, SOLUTION INTRAVENOUS at 13:59

## 2024-11-11 RX ADMIN — SODIUM CHLORIDE 20 ML/HR: 9 INJECTION, SOLUTION INTRAVENOUS at 13:55

## 2024-11-11 NOTE — PROGRESS NOTES
Patient tolerated IV Solu-Medrol without issues.      Luis Armando Engel is aware of future appt on 11/12/24 at 1400.     AVS -  No (Declined by Luis Armando Engel) Patient has Mychart.    Patient ambulated off unit without incident.  All personal belongings taken with patient.

## 2024-11-11 NOTE — PLAN OF CARE
Problem: Potential for Falls  Goal: Patient will remain free of falls  Description: INTERVENTIONS:  - Educate patient/family on patient safety including physical limitations  - Instruct patient to call for assistance with activity   - Consult OT/PT to assist with strengthening/mobility   - Keep Call bell within reach  - Keep bed low and locked with side rails adjusted as appropriate  - Keep care items and personal belongings within reach  - Initiate and maintain comfort rounds  - Make Fall Risk Sign visible to staff      - Apply yellow socks and bracelet for high fall risk patients  - Consider moving patient to room near nurses station  Outcome: Progressing

## 2024-11-12 ENCOUNTER — HOSPITAL ENCOUNTER (OUTPATIENT)
Dept: INFUSION CENTER | Facility: HOSPITAL | Age: 59
Discharge: HOME | End: 2024-11-12
Attending: PSYCHIATRY & NEUROLOGY
Payer: COMMERCIAL

## 2024-11-12 VITALS
DIASTOLIC BLOOD PRESSURE: 72 MMHG | OXYGEN SATURATION: 95 % | RESPIRATION RATE: 18 BRPM | SYSTOLIC BLOOD PRESSURE: 151 MMHG | HEART RATE: 100 BPM

## 2024-11-12 DIAGNOSIS — G35 MULTIPLE SCLEROSIS (HCC): Primary | ICD-10-CM

## 2024-11-12 PROCEDURE — 96365 THER/PROPH/DIAG IV INF INIT: CPT

## 2024-11-12 RX ORDER — SODIUM CHLORIDE 9 MG/ML
20 INJECTION, SOLUTION INTRAVENOUS ONCE
Status: COMPLETED | OUTPATIENT
Start: 2024-11-12 | End: 2024-11-12

## 2024-11-12 RX ORDER — SODIUM CHLORIDE 9 MG/ML
20 INJECTION, SOLUTION INTRAVENOUS ONCE
Status: CANCELLED | OUTPATIENT
Start: 2024-11-13

## 2024-11-12 RX ADMIN — SODIUM CHLORIDE 1000 MG: 0.9 INJECTION, SOLUTION INTRAVENOUS at 14:12

## 2024-11-12 RX ADMIN — SODIUM CHLORIDE 20 ML/HR: 9 INJECTION, SOLUTION INTRAVENOUS at 14:12

## 2024-11-12 NOTE — PROGRESS NOTES
Luis Armando Engel  tolerated IV solumedrol well with no complications.    Luis Armando Engel is aware of future appt tomorrow at 2PM.     AVS declined by Luis Armando Engel.

## 2024-11-13 ENCOUNTER — HOSPITAL ENCOUNTER (OUTPATIENT)
Dept: INFUSION CENTER | Facility: HOSPITAL | Age: 59
Discharge: HOME | End: 2024-11-13
Attending: PSYCHIATRY & NEUROLOGY
Payer: COMMERCIAL

## 2024-11-13 VITALS
RESPIRATION RATE: 18 BRPM | TEMPERATURE: 98.7 F | DIASTOLIC BLOOD PRESSURE: 86 MMHG | HEART RATE: 67 BPM | SYSTOLIC BLOOD PRESSURE: 132 MMHG | OXYGEN SATURATION: 97 %

## 2024-11-13 DIAGNOSIS — G35 MULTIPLE SCLEROSIS (HCC): Primary | ICD-10-CM

## 2024-11-13 PROCEDURE — 96365 THER/PROPH/DIAG IV INF INIT: CPT

## 2024-11-13 RX ORDER — SODIUM CHLORIDE 9 MG/ML
20 INJECTION, SOLUTION INTRAVENOUS ONCE
Status: CANCELLED | OUTPATIENT
Start: 2024-11-13

## 2024-11-13 RX ORDER — SODIUM CHLORIDE 9 MG/ML
20 INJECTION, SOLUTION INTRAVENOUS ONCE
Status: COMPLETED | OUTPATIENT
Start: 2024-11-13 | End: 2024-11-13

## 2024-11-13 RX ADMIN — SODIUM CHLORIDE 20 ML/HR: 0.9 INJECTION, SOLUTION INTRAVENOUS at 14:24

## 2024-11-13 RX ADMIN — SODIUM CHLORIDE 1000 MG: 0.9 INJECTION, SOLUTION INTRAVENOUS at 14:22

## 2024-11-13 NOTE — PROGRESS NOTES
Luis Armando Engel  tolerated iv solumedrol treatment well with no complications.      Luis Armando Engel is aware that he has completed treatments at this time.    AVS printed and given to Luis Armando Engel:  No (Declined by Luis Armando Engel)

## 2024-11-15 ENCOUNTER — APPOINTMENT (OUTPATIENT)
Age: 59
End: 2024-11-15
Payer: COMMERCIAL

## 2024-11-15 DIAGNOSIS — G35 MS (MULTIPLE SCLEROSIS) (HCC): ICD-10-CM

## 2024-11-15 LAB
25(OH)D3 SERPL-MCNC: 67.6 NG/ML (ref 30–100)
HAV IGM SER QL: NORMAL
HBV CORE IGM SER QL: NORMAL
HBV SURFACE AG SER QL: NORMAL
HCV AB SER QL: NORMAL

## 2024-11-15 PROCEDURE — 80074 ACUTE HEPATITIS PANEL: CPT

## 2024-11-15 PROCEDURE — 36415 COLL VENOUS BLD VENIPUNCTURE: CPT

## 2024-11-15 PROCEDURE — 82306 VITAMIN D 25 HYDROXY: CPT

## 2024-11-15 PROCEDURE — 86480 TB TEST CELL IMMUN MEASURE: CPT

## 2024-11-15 PROCEDURE — 86711 JOHN CUNNINGHAM ANTIBODY: CPT

## 2024-11-15 PROCEDURE — 86052 AQUAPORIN-4 ANTB CBA EACH: CPT

## 2024-11-15 PROCEDURE — 86051 AQUAPORIN-4 ANTB ELISA: CPT

## 2024-11-17 LAB
GAMMA INTERFERON BACKGROUND BLD IA-ACNC: <0 IU/ML
M TB IFN-G BLD-IMP: NEGATIVE
M TB IFN-G CD4+ BCKGRND COR BLD-ACNC: 0 IU/ML
M TB IFN-G CD4+ BCKGRND COR BLD-ACNC: 0 IU/ML
MITOGEN IGNF BCKGRD COR BLD-ACNC: 4.89 IU/ML

## 2024-11-19 ENCOUNTER — OFFICE VISIT (OUTPATIENT)
Dept: FAMILY MEDICINE CLINIC | Facility: CLINIC | Age: 59
End: 2024-11-19
Payer: COMMERCIAL

## 2024-11-19 VITALS
SYSTOLIC BLOOD PRESSURE: 104 MMHG | DIASTOLIC BLOOD PRESSURE: 80 MMHG | TEMPERATURE: 96.9 F | HEIGHT: 75 IN | HEART RATE: 106 BPM | BODY MASS INDEX: 30.76 KG/M2 | OXYGEN SATURATION: 98 % | WEIGHT: 247.4 LBS

## 2024-11-19 DIAGNOSIS — B37.0 THRUSH: ICD-10-CM

## 2024-11-19 DIAGNOSIS — R42 LIGHTHEADEDNESS: Primary | ICD-10-CM

## 2024-11-19 DIAGNOSIS — G35 MULTIPLE SCLEROSIS (HCC): ICD-10-CM

## 2024-11-19 DIAGNOSIS — R49.0 HOARSENESS OF VOICE: ICD-10-CM

## 2024-11-19 LAB — MISCELLANEOUS LAB TEST RESULT: NORMAL

## 2024-11-19 PROCEDURE — 99213 OFFICE O/P EST LOW 20 MIN: CPT | Performed by: FAMILY MEDICINE

## 2024-11-19 RX ORDER — CLOTRIMAZOLE 10 MG/1
10 LOZENGE ORAL 4 TIMES DAILY
Qty: 40 TABLET | Refills: 1 | Status: SHIPPED | OUTPATIENT
Start: 2024-11-19

## 2024-11-19 RX ORDER — MECLIZINE HYDROCHLORIDE 25 MG/1
25 TABLET ORAL EVERY 8 HOURS PRN
Qty: 30 TABLET | Refills: 4 | Status: SHIPPED | OUTPATIENT
Start: 2024-11-19

## 2024-11-19 NOTE — ASSESSMENT & PLAN NOTE
I cannot explain the worsening of his symptoms despite high-dose steroid treatment.  He of course is very anxious about the reoccurrence of his symptoms and of course concerned about continued worsening.  I tried to reassure him and will anxiously await knows of later this week

## 2024-11-19 NOTE — PROGRESS NOTES
Name: Luis Armando Engel      : 1965      MRN: [id number]  Encounter Provider: Rosario Dodge MD  Encounter Date: 2024   Encounter department: [facility]  :  Assessment & Plan  Lightheadedness  Patient's usual blood pressure averages 130s over 70s today much lower, likely due to mild dehydration and I am sure somewhat related to recent infusions.  He did request a trial of meclizine which I gave him though advised may not help his symptoms.  I encouraged increased hydration as well as increased salt in his diet and electrolyte drinks i.e. Gatorade.  Also advised he take it easy and stop in daily for BP checks until his appointment with neurology later this week.  Of course if symptoms worsen to call  Orders:    meclizine (ANTIVERT) 25 mg tablet; Take 1 tablet (25 mg total) by mouth every 8 (eight) hours as needed for dizziness    Hoarseness of voice  Likely due to recent steroids and possible thrush despite paucity of findings on exam. will give trial of lozenges as noted       Thrush    Orders:    clotrimazole (MYCELEX) 10 mg kenney; Take 1 tablet (10 mg total) by mouth 4 (four) times a day    Multiple sclerosis (HCC)  I cannot explain the worsening of his symptoms despite high-dose steroid treatment.  He of course is very anxious about the reoccurrence of his symptoms and of course concerned about continued worsening.  I tried to reassure him and will anxiously await knows of later this week              History of Present Illness     Patient completed 5 days of daily infusions of Solu-Medrol approximately 6 days ago.  He noticed hoarseness of his voice after the second or third infusion without sore throat or sore mouth without difficulty swallowing.  He also denied any associated cold symptoms.  His wife feels his voice has improved however still hoarse.  He also now is complaining of lightheadedness or dizziness for the past few days described as a feeling that he will  "pass out.  He denies any spinning and again without associated cold symptoms or fever.  He also denies any headaches, chest pain, shortness of breath, or palpitations.  He notices the wooziness especially on first arising and if bent over and straightening up without any syncopal episodes or falls.  Other than infusions as noted he denies any addition or change of medications.  His appetite has been good and is drinking although not sure if he is keeping as hydrated as he should be.  He is due to see neurology again in 3 days.  He of course is very concerned about his MS stating symptoms of anything have worsened since the IV infusion with increased numbness over his and his entire left leg as well as exacerbation of chronic numbness of his left arm without any weakness.  Interestingly enough the infusions did not cause anxiety or sleeplessness      Review of Systems       Objective   /80 (BP Location: Left arm, Patient Position: Sitting, Cuff Size: Adult)   Pulse (!) 106   Temp (!) 96.9 °F (36.1 °C) (Tympanic)   Ht 6' 3\" (1.905 m)   Wt 112 kg (247 lb 6.4 oz)   SpO2 98%   BMI 30.92 kg/m²      Physical Exam  Constitutional:       Appearance: Normal appearance.   HENT:      Mouth/Throat:      Mouth: Mucous membranes are dry.      Pharynx: Oropharynx is clear. No oropharyngeal exudate.      Comments: Pharynx negative though voice is hoarse  Neck:      Vascular: No carotid bruit.   Cardiovascular:      Pulses: Normal pulses.           Dorsalis pedis pulses are 2+ on the right side and 2+ on the left side.        Posterior tibial pulses are 2+ on the right side and 2+ on the left side.      Heart sounds: Normal heart sounds. No murmur heard.     Comments: Rhythm is regular with a rate of 104 without ectopics or murmur   repeat blood pressure 1 over 4/80 without tilt  Pulmonary:      Breath sounds: Normal breath sounds.   Musculoskeletal:      Right lower leg: No edema.      Left lower leg: No edema. "   Lymphadenopathy:      Cervical: No cervical adenopathy.   Neurological:      General: No focal deficit present.      Mental Status: He is alert and oriented to person, place, and time.      Cranial Nerves: Cranial nerves 2-12 are intact.      Motor: Motor function is intact.      Coordination: Romberg sign negative. Finger-Nose-Finger Test normal.      Comments: Decreased sensation to pinprick noted peripherally over left lower extremity   no hand drift no tremor   patient is slightly diaphoretic although anxious

## 2024-11-20 ENCOUNTER — TELEPHONE (OUTPATIENT)
Dept: FAMILY MEDICINE CLINIC | Facility: CLINIC | Age: 59
End: 2024-11-20

## 2024-11-20 NOTE — TELEPHONE ENCOUNTER
Advised wife he should check his blood pressure sitting then stand up for several minutes after which time he should check his blood pressure standing and let me know reading

## 2024-11-22 ENCOUNTER — OFFICE VISIT (OUTPATIENT)
Dept: NEUROLOGY | Facility: CLINIC | Age: 59
End: 2024-11-22
Payer: COMMERCIAL

## 2024-11-22 VITALS
OXYGEN SATURATION: 98 % | DIASTOLIC BLOOD PRESSURE: 78 MMHG | SYSTOLIC BLOOD PRESSURE: 106 MMHG | RESPIRATION RATE: 18 BRPM | TEMPERATURE: 98.2 F | HEIGHT: 75 IN | WEIGHT: 257.8 LBS | HEART RATE: 94 BPM | BODY MASS INDEX: 32.06 KG/M2

## 2024-11-22 DIAGNOSIS — G35 MULTIPLE SCLEROSIS (HCC): Primary | ICD-10-CM

## 2024-11-22 DIAGNOSIS — G43.109 OCULAR MIGRAINE: ICD-10-CM

## 2024-11-22 LAB
AQP4 H2O CHANNEL AB SERPL IA-ACNC: <1.5 U/ML (ref 0–3)
GALACTOMANNAN AG SERPL IA-ACNC: 0.17
JCPYV AB SERPL QL IA: NEGATIVE
JCPYV AB SERPL QL IA: NORMAL
SPECIMEN STATUS: NORMAL

## 2024-11-22 PROCEDURE — 99215 OFFICE O/P EST HI 40 MIN: CPT | Performed by: PHYSICIAN ASSISTANT

## 2024-11-22 NOTE — PROGRESS NOTES
Name: Luis Armando Engel      : 1965      MRN: [id number]  Encounter Provider: Ananya Cifuentes PA-C  Encounter Date: 2024   Encounter department: [facility] [place]    :  Assessment & Plan  Multiple sclerosis (HCC)  59 year old male formally diagnosed with MS in 2018 with first presenting symptoms/event in the late  with diplopia and apparent L arm drift.  He had imaging and LP at time of initial presentation with no definitive diagnosis.  About 20 years later he had R thigh numbness and tingling and L hand numbness.  Found to have t-spine lesion with positive LP (8 OCBs) and was started on Tecfidera in 2018.  He was on Tecfidera until .  He was tolerating the medication well but had lymphopenia and his neurologist took him off the med and felt he did not need further treatment with DMT due to his age, longevity of his disease process, and at that point overall stability.  He has been off DMT since early .    He more recently had event with LLE numbness and tingling and was found to have an enhancing lesion at T1-2.  He was recently given 5 days of IV steroids.  He has improved slightly.    We discussed he still has active disease and DMT is indicated.  We discussed options today.  He is JCV negative.  Since he tolerated Tecfidera well, we can try Vumerity.  Did discuss there is risk of lymphopenia with this medication as well, and we will need to monitor him closely.  He will get monthly CBC/LFTs done and if there is progressive lymphopenia, Vumerity will be discontinued and we will need to change to a different DMT.  Start form was signed for Vumerity today.    Discussed I would expect the sensory changes in the LLE to improve over time, however we cannot predict the degree of recovery.  I encouraged him to continue to be active and lead an overall healthy lifestyle with diet and exercise.    He had several questions about prognosis.  Discussed we can never predict the  course of someone's disease process.  It does seem promising that he was off DMT for nearly 4 years before having an exacerbation.  I would hope that putting him back on DMT keeps him stable.  We also discussed that around the age of 67 we may take him off DMT due to the concept of immunosenescence.      Plan:  - Start Vumerity (start form signed and will be submitted)  - Starting 1 month after beginning Vumerity get monthly blood work done with standing order  - Repeat imaging approximately 6 months after starting Vumerity  - Encouraged healthy lifestyle including diet and routine exercise      Orders:    CBC and differential; Standing    Hepatic function panel; Standing    Ocular migraine  Patient reports long history of ocular migraines with visual changes x 20 minutes, with or without a headache afterwards.  We did not spend much time discussing this today as the majority of the time was spent discussing his MS.  He has been having some more ocular migraines recently, which she attributes to stress and anxiety surrounding his MS exacerbation, which is certainly possible.  We discussed the importance of staying well-hydrated, eating regularly/not skipping meals and getting adequate sleep.  I suggested he start magnesium oxide 400-500 mg daily and B2 (riboflavin) 400 mg daily       Follow-up in 2 months or sooner if needed      History of Present Illness   HPI  Luis Armando Engel is a 59 y.o. male who presents today for evaluation of Multiple Sclerosis.  He recently established care with our practice and was seen by Addy RUSSELL for urgent new patient evaluation on 11/5/2024.    Patient was previously followed by [facility], Dr. Carson, Dr. Vick, and most recently Dr. Jules, last visit in January 2021.  Patient reports he was diagnosed with MS formally in 2017, but had his first symptoms with question of MS starting in the late 1990s.  His first presenting symptoms were diplopia and dizziness and left  arm drift.  There was no definitive diagnosis made (he had a LP and brain MRI).  About 20 years later he had his next exacerbation with RLE (mainly proximal) numbness and tingling, then patchy paresthesias throughout the leg, as well as left hand tingling.  LP MS panel 2/2018 with 8 OCBs, there was a T6 lesion, as well as lesions in the brain at that time.  MS diagnosis was made and he was advised to start DMT.  He was also found to have B12 deficiency per notes.  He started Tecfidera in 2018 and did well on the medication without side effects.  JCV negative.  He reports he was having lymphopenia with the drug that they were monitoring.  In 2021, Dr. Jules said he could stop the medication due to lymphopenia and his age, and he did not require DMT since he had been stable.    More recently, patient presented to his PCP c/o LLE numbness and tingling.  His PCP ordered MRI brain, oral steroids and referred to neurology.  There was no weakness, no bowel/bladder changes.  He was seen by Addy on 11/5/2024 who ordered STAT cord imaging and advised follow up with MS team after imaging.    MRI brain MS w/wo contrast 10/30/2024  Several foci of FLAIR and T2 abnormality are identified in the juxtacortical regions, periventricular regions as well as the right medulla and cervical medullary junction compatible with the provided history of multiple sclerosis. There are no enhancing lesions identified. There are no prior studies available for direct comparison.    MRI c-spine 11/6/2024 w/wo contrast  No signal abnormality is noted within the cervical spinal cord. No enhancing lesion noted involving the cervical spinal cord.     MRI t-spine 11/6/2024 w/wo contrast  Demyelinating plaques involving the thoracic spinal cord on the right at T1-T2,, involving the left lateral aspect of the cord at the level of T6 focally, and possibly involving the left aspect of the thoracic spinal cord at the level of the T10-T11 intervertebral  "disc. Enhancement noted involving the thoracic cord demyelinating plaque at the level of T1-T2, indicative of an acute/active demyelinating plaque    Patient was given 5 days of IV steroids 11/8-11/13/2024.    Today, patient presents with his wife.  He reports he is doing well overall, although he still has numbness and tingling in the LLE.  It may be a little better than prior to steroids.  He notes his left hand is also tingling (this was a prior symptom).  He is not having any difficulty walking, no weakness, falls, trips.  No bowel or bladder changes.  He was having some hoarseness of voice and saw PCP earlier this week who is treating him for possible thrush related to the steroids.  He was getting some lightheadeness as well, this has been better.  He is anxious about having an exacerbation after so many years.  He is agreeable to resume DMT.  Labs completed 11/15/2024.  JCV negative with index 0.17, NMO negative, Quantiferon Gold negative, acute hepatitis panel negative.    Patient also just wanted to mention his ocular migraines.  He reports a very longstanding history of this.  He typically gets a visual aura \"like a light show\" that lasts about 20 min (very stereo-typed).  Can then get a HA, or sometimes not.  He notes these have been more frequent recently.  He had one on 11/4, 11/15, 11/19.  Wonders if stress can cause an increase.     Review of Systems   Constitutional: Negative.  Negative for chills and fever.   HENT: Negative.  Negative for ear pain and sore throat.    Eyes: Negative.  Negative for pain and visual disturbance.   Respiratory: Negative.  Negative for cough and shortness of breath.    Cardiovascular: Negative.  Negative for chest pain and palpitations.   Gastrointestinal: Negative.  Negative for abdominal pain and vomiting.   Endocrine: Negative.    Genitourinary: Negative.  Negative for dysuria and hematuria.   Musculoskeletal:  Negative for arthralgias, back pain and gait problem. " "  Skin: Negative.  Negative for color change and rash.   Allergic/Immunologic: Negative.    Neurological:  Positive for weakness (L sided - hand and foot), numbness and headaches (occular migraines - recent (couple days)). Negative for seizures and syncope.   Hematological: Negative.    Psychiatric/Behavioral: Negative.     All other systems reviewed and are negative.    I have personally reviewed the MA's review of systems and made changes as necessary.    Current Outpatient Medications on File Prior to Visit   Medication Sig Dispense Refill    Ativan 1 MG tablet Take 1 tablet (1 mg total) by mouth every 8 (eight) hours as needed for anxiety 90 tablet 0    clotrimazole (MYCELEX) 10 mg kenney Take 1 tablet (10 mg total) by mouth 4 (four) times a day 40 tablet 1    meclizine (ANTIVERT) 25 mg tablet Take 1 tablet (25 mg total) by mouth every 8 (eight) hours as needed for dizziness 30 tablet 4     No current facility-administered medications on file prior to visit.         Objective   /78 (BP Location: Left arm, Patient Position: Sitting, Cuff Size: Large)   Pulse 94   Temp 98.2 °F (36.8 °C) (Temporal)   Resp 18   Ht 6' 3\" (1.905 m)   Wt 117 kg (257 lb 12.8 oz)   SpO2 98%   BMI 32.22 kg/m²     Physical Exam  Constitutional:       Appearance: Normal appearance.   Eyes:      Extraocular Movements: EOM normal.      Pupils: Pupils are equal, round, and reactive to light.   Neurological:      Mental Status: He is alert.      Motor: Motor strength is normal.     Deep Tendon Reflexes:      Reflex Scores:       Bicep reflexes are 2+ on the right side and 2+ on the left side.       Brachioradialis reflexes are 2+ on the right side and 2+ on the left side.       Patellar reflexes are 2+ on the right side and 2+ on the left side.       Achilles reflexes are 1+ on the right side and 1+ on the left side.  Psychiatric:         Mood and Affect: Mood normal.         Speech: Speech normal.         Behavior: Behavior " normal.       Neurological Exam  Mental Status  Alert. Oriented to person, place, time and situation. Speech is normal. Language is fluent with no aphasia. Attention and concentration are normal.    Cranial Nerves  CN II: Visual fields full to confrontation.  CN III, IV, VI: Extraocular movements intact bilaterally. Pupils equal round and reactive to light bilaterally.  CN V: Facial sensation is normal.  CN VII: Full and symmetric facial movement.  CN VIII: Hearing is normal.  CN IX, X: Palate elevates symmetrically  CN XI: Shoulder shrug strength is normal.  CN XII: Tongue midline without atrophy or fasciculations.    Motor   Normal muscle tone. No abnormal involuntary movements. Strength is 5/5 throughout all four extremities.    Sensory  Light touch is normal in upper and lower extremities. Vibration abnormality: Decreased at the toes bilaterally .     Reflexes                                            Right                      Left  Brachioradialis                    2+                         2+  Biceps                                 2+                         2+  Patellar                                2+                         2+  Achilles                                1+                         1+    Coordination  Right: Finger-to-nose normal.Left: Finger-to-nose normal.    Gait  Casual gait is normal including stance, stride, and arm swing.  T25F walk = 6.50 seconds without assistive device .       Results/Data:  I have reviewed the results and images from the 10/30/24 MRI brain, 11/6/24 MRI c-spine and t-spine in detail with the patient.    I have spent a total time of 75 minutes in caring for this patient on the day of the visit/encounter including Diagnostic results, Prognosis, Risks and benefits of tx options, Instructions for management, Patient and family education, Risk factor reductions, Impressions, Counseling / Coordination of care, Documenting in the medical record, Reviewing / ordering  tests, medicine, procedures  , and Obtaining or reviewing history  .

## 2024-11-22 NOTE — PATIENT INSTRUCTIONS
Start form signed for Vumerity, will get this submitted  Starting 1 month after initiating therapy, begin getting monthly blood work with standing order  Follow up in 2 months

## 2024-11-22 NOTE — ASSESSMENT & PLAN NOTE
59 year old male formally diagnosed with MS in 2018 with first presenting symptoms/event in the late 1990s with diplopia and apparent L arm drift.  He had imaging and LP at time of initial presentation with no definitive diagnosis.  About 20 years later he had R thigh numbness and tingling and L hand numbness.  Found to have t-spine lesion with positive LP (8 OCBs) and was started on Tecfidera in 2018.  He was on Tecfidera until 2021.  He was tolerating the medication well but had lymphopenia and his neurologist took him off the med and felt he did not need further treatment with DMT due to his age, longevity of his disease process, and at that point overall stability.  He has been off DMT since early 2021.    He more recently had event with LLE numbness and tingling and was found to have an enhancing lesion at T1-2.  He was recently given 5 days of IV steroids.  He has improved slightly.    We discussed he still has active disease and DMT is indicated.  We discussed options today.  He is JCV negative.  Since he tolerated Tecfidera well, we can try Vumerity.  Did discuss there is risk of lymphopenia with this medication as well, and we will need to monitor him closely.  He will get monthly CBC/LFTs done and if there is progressive lymphopenia, Vumerity will be discontinued and we will need to change to a different DMT.  Start form was signed for Vumerity today.    Discussed I would expect the sensory changes in the LLE to improve over time, however we cannot predict the degree of recovery.  I encouraged him to continue to be active and lead an overall healthy lifestyle with diet and exercise.    He had several questions about prognosis.  Discussed we can never predict the course of someone's disease process.  It does seem promising that he was off DMT for nearly 4 years before having an exacerbation.  I would hope that putting him back on DMT keeps him stable.  We also discussed that around the age of 67 we may take  him off DMT due to the concept of immunosenescence.      Plan:  - Start Vumerity (start form signed and will be submitted)  - Starting 1 month after beginning Vumerity get monthly blood work done with standing order  - Repeat imaging approximately 6 months after starting Vumerity  - Encouraged healthy lifestyle including diet and routine exercise      Orders:    CBC and differential; Standing    Hepatic function panel; Standing

## 2024-11-27 ENCOUNTER — TELEPHONE (OUTPATIENT)
Dept: NEUROLOGY | Facility: CLINIC | Age: 59
End: 2024-11-27

## 2024-11-27 PROBLEM — G43.109 OCULAR MIGRAINE: Status: ACTIVE | Noted: 2024-11-27

## 2024-11-27 NOTE — ASSESSMENT & PLAN NOTE
Patient reports long history of ocular migraines with visual changes x 20 minutes, with or without a headache afterwards.  We did not spend much time discussing this today as the majority of the time was spent discussing his MS.  He has been having some more ocular migraines recently, which she attributes to stress and anxiety surrounding his MS exacerbation, which is certainly possible.  We discussed the importance of staying well-hydrated, eating regularly/not skipping meals and getting adequate sleep.  I suggested he start magnesium oxide 400-500 mg daily and B2 (riboflavin) 400 mg daily

## 2024-11-27 NOTE — TELEPHONE ENCOUNTER
Received Vumerity Start Form from Ananya. Form has been signed and faxed to fax #[phone].Placed signed form in scanning bin Huy denton.

## 2024-12-05 ENCOUNTER — TELEPHONE (OUTPATIENT)
Dept: NEUROLOGY | Facility: CLINIC | Age: 59
End: 2024-12-05

## 2024-12-09 NOTE — TELEPHONE ENCOUNTER
Inbound call received form [facility] for [facility] regarding a fax sent on 12-3-24   Prior Authorization for Vulerity that was initiated.     KEY: [id number]    Pending Determination

## 2024-12-10 NOTE — TELEPHONE ENCOUNTER
It appears PA was submitted:    Vumerity is approved from 10/9/24 to 12/8/26.     Per [facility] portal, medication is scheduled to be delivered to pt tomorrow.

## 2025-01-03 ENCOUNTER — NURSE TRIAGE (OUTPATIENT)
Age: 60
End: 2025-01-03

## 2025-01-03 DIAGNOSIS — U07.1 COVID-19: Primary | ICD-10-CM

## 2025-01-03 RX ORDER — NIRMATRELVIR AND RITONAVIR 300-100 MG
3 KIT ORAL 2 TIMES DAILY
Qty: 30 TABLET | Refills: 0 | Status: SHIPPED | OUTPATIENT
Start: 2025-01-03 | End: 2025-01-08

## 2025-01-03 NOTE — TELEPHONE ENCOUNTER
Outbound call placed to patient.     Reviewed message below with patient. Pt verbalized understanding and had no further questions. Thankful for the call back.

## 2025-01-08 ENCOUNTER — TELEPHONE (OUTPATIENT)
Age: 60
End: 2025-01-08

## 2025-01-08 NOTE — TELEPHONE ENCOUNTER
"Called [facility] and spoke with Makayla. She informed that the starter kit pack was delivered to pt on 12/13/2024. However, the claim has not been paid by the benefit plan, this is still pending.    When they run the script for the maintenance dose, it is rejecting stating max quantity for 30 day supply is 100 capsules.    Unfortunately, approval letter does not show what quantity is approved for Vumerity. Will have to call the insurance company and see why still rejecting even though quantity requested on Pending sale to [facility] was for 120 capsules per 30 days.    Called [employer] at [phone]. Spoke with Annika. She says it looks like [facility] is trying to fill it too soon. Literally one day away from not being on the status of \"too soon to fill\". Rep changed request for future date of tomorrow and she received a message stating \"processed claim successful\".    Will call [facility] tomorrow for processing script.    Spoke with pt and advised him of all. Pt appreciative.            "

## 2025-01-08 NOTE — TELEPHONE ENCOUNTER
Pt calling in stating he needs a prior auth for medication Vumerity. Per last encounter Vumerity was approved from 10/9/24 to 12/8/26. . Pt asking if we can contact pharmacy to straighten things out as they are telling pt they can not fill medication because it needs a prior auth.     Please assist, Thank you.

## 2025-01-09 NOTE — TELEPHONE ENCOUNTER
Pt called in stating he called insurance and they denied it and he has two days of medication left

## 2025-01-09 NOTE — TELEPHONE ENCOUNTER
"Called [facility] at [phone]. Spoke with Ashley. She says they are getting a rejection. Informed her of conversation with Eyad yesterday. Advised that the test claim was processed for 120 capsules per 30 days. She says that since start form is requesting 360 capsules per 90 days, they will need a verbal order to process maintenance dose as 120 capsules per 30 days.    (Pharmacist: [phone] option 2). Rep transferred to pharmacist. Spoke with Alisha. She says no need for verbal order. All the rep needed to do was a \"rework\" for a 30 day supply.     Transferred to rep Leila. She says she can do the \"rework\" but the process does take 2-3 business days. They will know at that time if it comes back with rejection again.    Will follow up with [facility] in 3 business days.      "

## 2025-01-14 ENCOUNTER — TELEPHONE (OUTPATIENT)
Age: 60
End: 2025-01-14

## 2025-01-14 NOTE — TELEPHONE ENCOUNTER
March 6, 2019     Patient: Orion Petty   YOB: 1958   Date of Visit: 3/6/2019       To Whom it May Concern:    Orion Petty is under my professional care  He was seen in my office on 3/6/2019  He may return to work on 3/7/2019  If you have any questions or concerns, please don't hesitate to call           Sincerely,          TAMICA Harrington        CC: No Recipients Outbound call placed to patient, no answer. LMOM okay per communication form.    Stated message below from provider. Provided office number to call back with any questions.

## 2025-01-14 NOTE — TELEPHONE ENCOUNTER
Spoke with pt. He started a new bottle of Vumerity 462 mg 2 days ago. He has been taking the 462 mg dose prior to this with no issues. For the past two nights, he has been having head pain when he lays down to sleep. The pain is in the entire top of his head, where his hairline is. The pain is only present when he lays down. Pt takes ibuprofen, and it does help to relieve the pain. Pt also has had a tooth abscess for the past month. He is questioning if the head pain could be related to that as well. He has a visit scheduled with his dentist tomorrow. Pt is questioning if it could be the Vumerity since he stared a brand new bottle the same time this head pain started, or if this could just be a coincidence. He has taken the 462 mg dose for several weeks now without any issues prior to this. Routed to the provider to advise.

## 2025-01-14 NOTE — TELEPHONE ENCOUNTER
I would not think it would be the Vumerity.  He could check with the pharmacy to ensure there was no change from his last bottle to this bottle for any reason.  More likely the tooth abscess is causing issues.  Agree with seeing his dentist.  Can see how he feels after the abscess is treated

## 2025-01-17 ENCOUNTER — APPOINTMENT (OUTPATIENT)
Age: 60
End: 2025-01-17
Payer: COMMERCIAL

## 2025-01-17 DIAGNOSIS — G35 MULTIPLE SCLEROSIS (HCC): ICD-10-CM

## 2025-01-17 LAB
ALBUMIN SERPL BCG-MCNC: 4.3 G/DL (ref 3.5–5)
ALP SERPL-CCNC: 29 U/L (ref 34–104)
ALT SERPL W P-5'-P-CCNC: 42 U/L (ref 7–52)
AST SERPL W P-5'-P-CCNC: 25 U/L (ref 13–39)
BASOPHILS # BLD AUTO: 0.08 THOUSANDS/ΜL (ref 0–0.1)
BASOPHILS NFR BLD AUTO: 2 % (ref 0–1)
BILIRUB DIRECT SERPL-MCNC: 0.28 MG/DL (ref 0–0.2)
BILIRUB SERPL-MCNC: 1.55 MG/DL (ref 0.2–1)
EOSINOPHIL # BLD AUTO: 0.05 THOUSAND/ΜL (ref 0–0.61)
EOSINOPHIL NFR BLD AUTO: 1 % (ref 0–6)
ERYTHROCYTE [DISTWIDTH] IN BLOOD BY AUTOMATED COUNT: 12.9 % (ref 11.6–15.1)
HCT VFR BLD AUTO: 43.5 % (ref 36.5–49.3)
HGB BLD-MCNC: 14.5 G/DL (ref 12–17)
IMM GRANULOCYTES # BLD AUTO: 0.02 THOUSAND/UL (ref 0–0.2)
IMM GRANULOCYTES NFR BLD AUTO: 1 % (ref 0–2)
LYMPHOCYTES # BLD AUTO: 0.83 THOUSANDS/ΜL (ref 0.6–4.47)
LYMPHOCYTES NFR BLD AUTO: 19 % (ref 14–44)
MCH RBC QN AUTO: 29.6 PG (ref 26.8–34.3)
MCHC RBC AUTO-ENTMCNC: 33.3 G/DL (ref 31.4–37.4)
MCV RBC AUTO: 89 FL (ref 82–98)
MONOCYTES # BLD AUTO: 0.49 THOUSAND/ΜL (ref 0.17–1.22)
MONOCYTES NFR BLD AUTO: 11 % (ref 4–12)
NEUTROPHILS # BLD AUTO: 2.93 THOUSANDS/ΜL (ref 1.85–7.62)
NEUTS SEG NFR BLD AUTO: 66 % (ref 43–75)
NRBC BLD AUTO-RTO: 0 /100 WBCS
PLATELET # BLD AUTO: 251 THOUSANDS/UL (ref 149–390)
PMV BLD AUTO: 10.7 FL (ref 8.9–12.7)
PROT SERPL-MCNC: 6.4 G/DL (ref 6.4–8.4)
RBC # BLD AUTO: 4.9 MILLION/UL (ref 3.88–5.62)
WBC # BLD AUTO: 4.4 THOUSAND/UL (ref 4.31–10.16)

## 2025-01-17 PROCEDURE — 36415 COLL VENOUS BLD VENIPUNCTURE: CPT

## 2025-01-17 PROCEDURE — 85025 COMPLETE CBC W/AUTO DIFF WBC: CPT

## 2025-01-17 PROCEDURE — 80076 HEPATIC FUNCTION PANEL: CPT

## 2025-01-22 ENCOUNTER — OFFICE VISIT (OUTPATIENT)
Dept: NEUROLOGY | Facility: CLINIC | Age: 60
End: 2025-01-22
Payer: COMMERCIAL

## 2025-01-22 VITALS
HEART RATE: 72 BPM | TEMPERATURE: 97.1 F | WEIGHT: 255.8 LBS | RESPIRATION RATE: 18 BRPM | SYSTOLIC BLOOD PRESSURE: 118 MMHG | HEIGHT: 75 IN | OXYGEN SATURATION: 93 % | BODY MASS INDEX: 31.81 KG/M2 | DIASTOLIC BLOOD PRESSURE: 62 MMHG

## 2025-01-22 DIAGNOSIS — M54.2 NECK PAIN: ICD-10-CM

## 2025-01-22 DIAGNOSIS — G43.109 OCULAR MIGRAINE: ICD-10-CM

## 2025-01-22 DIAGNOSIS — G35 MULTIPLE SCLEROSIS (HCC): Primary | ICD-10-CM

## 2025-01-22 PROCEDURE — 99214 OFFICE O/P EST MOD 30 MIN: CPT | Performed by: PHYSICIAN ASSISTANT

## 2025-01-22 RX ORDER — DIROXIMEL FUMARATE 231 MG/1
CAPSULE ORAL
COMMUNITY
Start: 2025-01-10

## 2025-01-22 RX ORDER — CYCLOBENZAPRINE HCL 10 MG
10 TABLET ORAL
Qty: 15 TABLET | Refills: 0 | Status: SHIPPED | OUTPATIENT
Start: 2025-01-22

## 2025-01-22 NOTE — ASSESSMENT & PLAN NOTE
59 year old male formally diagnosed with MS in 2018 with first presenting symptoms/event in the late 1990s with diplopia and apparent L arm drift. He had imaging and LP at time of initial presentation with no definitive diagnosis. About 20 years later he had R thigh numbness and tingling and L hand numbness. Found to have t-spine lesion with positive LP (8 OCBs) and was started on Tecfidera in 2018. He was on Tecfidera until 2021. He was tolerating the medication well but had lymphopenia and his neurologist took him off the med and felt he did not need further treatment with DMT due to his age, longevity of his disease process, and at that point overall stability. He has been off DMT since early 2021.     In late Oct 2024 he developed LLE numbness and tingling and was found to have an enhancing lesion at T1-2. He was recently given 5 days of IV steroids.     I saw him after the steroids and discussed resuming DMT due to active disease.  We decided on Vumerity, with close monitoring of his CBC due to lymphopenia with Tecfidera in the past.    He started Vumerity about 6 weeks ago and is tolerating well.  He had blood work done on 1/17/2025.  WBC 4.40, ALC 0.83 (prior to starting Vumerity 0.86).  We discussed continuing to monitor with monthly blood work, standing order already in place.    Will update imaging (MRI brain and T-spine) in June 2025 (6 months after starting Vumerity).    His neurologic exam is stable today.

## 2025-01-22 NOTE — PATIENT INSTRUCTIONS
For your neck, use a heating pad, light stretching.  Can use flexeril (cyclobenzaprine) 10mg at bedtime for the next 7-10 days (muscle relaxer).  Try a new pillow, massage.  If no improvement, could consider physical therapy.     Continue Vumerity  Continue to get blood work done once a month so we can monitor the white blood cell count/absolute lymphocyte count.    Will repeat imaging around June 2025 (6 months after starting Vumerity--will order at next visit)    Return in 4 months or sooner if needed

## 2025-01-22 NOTE — ASSESSMENT & PLAN NOTE
At timing of initial visit with me he reported longstanding history of ocular migraines with visual changes for about 20 minutes, with or without headache afterwards.  He feels these have been stable overall.  Will continue to monitor.

## 2025-01-22 NOTE — PROGRESS NOTES
Name: Luis Armando Engel      : 1965      MRN: [id number]  Encounter Provider: Ananya Cifuentes PA-C  Encounter Date: 2025   Encounter department: [facility] [place]  :  Assessment & Plan  Multiple sclerosis (HCC)  59 year old male formally diagnosed with MS in 2018 with first presenting symptoms/event in the late  with diplopia and apparent L arm drift. He had imaging and LP at time of initial presentation with no definitive diagnosis. About 20 years later he had R thigh numbness and tingling and L hand numbness. Found to have t-spine lesion with positive LP (8 OCBs) and was started on Tecfidera in . He was on Tecfidera until . He was tolerating the medication well but had lymphopenia and his neurologist took him off the med and felt he did not need further treatment with DMT due to his age, longevity of his disease process, and at that point overall stability. He has been off DMT since early .     In late Oct 2024 he developed LLE numbness and tingling and was found to have an enhancing lesion at T1-2. He was recently given 5 days of IV steroids.     I saw him after the steroids and discussed resuming DMT due to active disease.  We decided on Vumerity, with close monitoring of his CBC due to lymphopenia with Tecfidera in the past.    He started Vumerity about 6 weeks ago and is tolerating well.  He had blood work done on 2025.  WBC 4.40, ALC 0.83 (prior to starting Vumerity 0.86).  We discussed continuing to monitor with monthly blood work, standing order already in place.    Will update imaging (MRI brain and T-spine) in 2025 (6 months after starting Vumerity).    His neurologic exam is stable today.       Neck pain  Patient reports he had COVID around New Year's and since then has been having some left-sided neck pain on awakening in the morning, which improves throughout the day.  It seems like he has some muscular spasm.  He was also having some  headaches (more like sensitivity on his scalp) during COVID.  This has been getting better.    I will prescribe cyclobenzaprine 10 mg take 1 tablet at bedtime for the next 7-10 days to see if this helps.  I also suggested heat and stretching.  If no improvement, will send to physical therapy.  Orders:    cyclobenzaprine (FLEXERIL) 10 mg tablet; Take 1 tablet (10 mg total) by mouth daily at bedtime    Ocular migraine  At timing of initial visit with me he reported longstanding history of ocular migraines with visual changes for about 20 minutes, with or without headache afterwards.  He feels these have been stable overall.  Will continue to monitor.       Patient will return in 4 months or sooner if needed.  He was advised to call the office for any new or worsening symptoms in the meantime.        History of Present Illness   HPI  Luis Armando Engel is a 59 y.o. male who presents today for follow up evaluation of Multiple Sclerosis.      Patient was previously followed by [facility], Dr. Carson, Dr. Vick, and most recently Dr. Jules, last visit in January 2021. Patient reports he was diagnosed with MS formally in 2017, but had his first symptoms with question of MS starting in the late 1990s. His first presenting symptoms were diplopia and dizziness and left arm drift. There was no definitive diagnosis made (he had a LP and brain MRI). About 20 years later he had his next exacerbation with RLE (mainly proximal) numbness and tingling, then patchy paresthesias throughout the leg, as well as left hand tingling. LP MS panel 2/2018 with 8 OCBs, there was a T6 lesion, as well as lesions in the brain at that time. MS diagnosis was made and he was advised to start DMT. He was also found to have B12 deficiency per notes. He started Tecfidera in 2018 and did well on the medication without side effects. JCV negative. He reports he was having lymphopenia with the drug that they were monitoring. In 2021, Dr. Jules said he  could stop the medication due to lymphopenia and his age, and he did not require DMT since he had been stable.     More recently, patient presented to his PCP c/o LLE numbness and tingling. His PCP ordered MRI brain, oral steroids and referred to neurology. There was no weakness, no bowel/bladder changes.   He was seen by my colleague Addy Sharp on 11/5/2024 for an urgent fit in new patient appt, STAT cord imaging ordered, and advised follow up with MS team after imaging.     MRI brain MS w/wo contrast 10/30/2024  Several foci of FLAIR and T2 abnormality are identified in the juxtacortical regions, periventricular regions as well as the right medulla and cervical medullary junction compatible with the provided history of multiple sclerosis. There are no enhancing lesions identified. There are no prior studies available for direct comparison.     MRI c-spine 11/6/2024 w/wo contrast  No signal abnormality is noted within the cervical spinal cord. No enhancing lesion noted involving the cervical spinal cord.      MRI t-spine 11/6/2024 w/wo contrast  Demyelinating plaques involving the thoracic spinal cord on the right at T1-T2,, involving the left lateral aspect of the cord at the level of T6 focally, and possibly involving the left aspect of the thoracic spinal cord at the level of the T10-T11 intervertebral disc. Enhancement noted involving the thoracic cord demyelinating plaque at the level of T1-T2, indicative of an acute/active demyelinating plaque     Patient was given 5 days of IV steroids 11/8-11/13/2024.     I saw patient on 11/22/2024.  At that time, patient was with his wife. He reported he was doing well overall, although he still had numbness and tingling in the LLE. It was a little better than prior to steroids. He noted his left hand was also tingling (this was a prior symptom). He was not having any difficulty walking, no weakness, falls, trips. No bowel or bladder changes. He was having some  "hoarseness of voice and saw PCP who was treating him for possible thrush related to the steroids. He was anxious about having an exacerbation after so many years. He was agreeable to resume DMT.  Labs completed 11/15/2024. JCV negative with index 0.17, NMO negative, Quantiferon Gold negative, acute hepatitis panel negative.  Patient also just wanted to mention his ocular migraines. He reported a very longstanding history of this. He typically gets a visual aura \"like a light show\" that lasts about 20 min (very stereo-typed). Can then get a HA, or sometimes not.     Today, patient reports he is overall doing well.  At timing of last visit, we discussed resuming DMT and he was agreeable to start Vumerity.  Due to his history of lymphopenia with Tecfidera I suggested monthly CBC and LFTs.  He started Vumerity in the middle of December.  He is tolerating well.  He had blood work done on 1/17/2025.  WBC 4.40, ALC 0.83 (prior to starting ALC 0.86).  AST and ALT normal.  He does have chronically elevated bilirubin, which was stable.  He denies any new MS symptoms.  Right around New Year's, he was diagnosed with COVID, mainly just cold symptoms.  He did take Paxlovid.  A few days into his COVID diagnosis he started to get some pain in his head (felt like pain in his scalp all over).  It would bother him if he laid on that side.  He also had a dental abscess at the same time.  He called our office questioning if this was due to Vumerity because he had just started a new bottle when the symptoms started.  This was not felt to be the case.  He says it is better now.  He still has some neck pain on the left side.  He says his neck bothers him when he wakes up in the morning and then gets better throughout the day.  No speech or swallowing difficulty.  No vision changes.  No new bowel or bladder changes.  No weakness.  His legs feel much better from when he first had the onset of symptoms.      Review of Systems " "  Constitutional: Negative.  Negative for chills, fatigue and fever.   HENT: Negative.  Negative for hearing loss, tinnitus and trouble swallowing.    Eyes:  Negative for photophobia, pain and visual disturbance.   Respiratory: Negative.  Negative for cough and shortness of breath.    Cardiovascular: Negative.  Negative for chest pain and palpitations.   Gastrointestinal:  Negative for constipation, diarrhea, nausea and vomiting.   Endocrine: Negative.    Genitourinary: Negative.  Negative for difficulty urinating and urgency.   Musculoskeletal: Negative.  Negative for gait problem.   Skin: Negative.  Negative for rash.   Neurological:  Positive for headaches. Negative for dizziness, tremors, seizures, weakness and numbness.   Hematological: Negative.    Psychiatric/Behavioral:  Negative for confusion and sleep disturbance.     I have personally reviewed the MA's review of systems and made changes as necessary.    Current Outpatient Medications on File Prior to Visit   Medication Sig Dispense Refill    Vumerity 231 MG CPDR       Ativan 1 MG tablet Take 1 tablet (1 mg total) by mouth every 8 (eight) hours as needed for anxiety (Patient not taking: Reported on 1/22/2025) 90 tablet 0    clotrimazole (MYCELEX) 10 mg kenney Take 1 tablet (10 mg total) by mouth 4 (four) times a day (Patient not taking: Reported on 1/22/2025) 40 tablet 1    meclizine (ANTIVERT) 25 mg tablet Take 1 tablet (25 mg total) by mouth every 8 (eight) hours as needed for dizziness (Patient not taking: Reported on 1/22/2025) 30 tablet 4     No current facility-administered medications on file prior to visit.         Objective   /62 (BP Location: Left arm, Patient Position: Sitting, Cuff Size: Large)   Pulse 72   Temp (!) 97.1 °F (36.2 °C) (Temporal)   Resp 18   Ht 6' 3\" (1.905 m)   Wt 116 kg (255 lb 12.8 oz)   SpO2 93%   BMI 31.97 kg/m²     Physical Exam  Constitutional:       Appearance: Normal appearance.   Eyes:      Extraocular " Movements: EOM normal.      Pupils: Pupils are equal, round, and reactive to light.   Neurological:      Mental Status: He is alert.      Motor: Motor strength is normal.     Deep Tendon Reflexes:      Reflex Scores:       Bicep reflexes are 2+ on the right side and 2+ on the left side.       Brachioradialis reflexes are 2+ on the right side and 2+ on the left side.       Patellar reflexes are 2+ on the right side and 2+ on the left side.       Achilles reflexes are 1+ on the right side and 1+ on the left side.  Psychiatric:         Mood and Affect: Mood normal.         Speech: Speech normal.         Behavior: Behavior normal.       Neurological Exam  Mental Status  Alert. Oriented to person, place, time and situation. Speech is normal. Language is fluent with no aphasia. Attention and concentration are normal.    Cranial Nerves  CN II: Visual fields full to confrontation.  CN III, IV, VI: Extraocular movements intact bilaterally. Pupils equal round and reactive to light bilaterally.  CN V: Facial sensation is normal.  CN VII: Full and symmetric facial movement.  CN VIII: Hearing is normal.  CN IX, X: Palate elevates symmetrically  CN XI: Shoulder shrug strength is normal.  CN XII: Tongue midline without atrophy or fasciculations.    Motor   Normal muscle tone. No abnormal involuntary movements. Strength is 5/5 throughout all four extremities.    Sensory  Light touch is normal in upper and lower extremities.     Reflexes                                            Right                      Left  Brachioradialis                    2+                         2+  Biceps                                 2+                         2+  Patellar                                2+                         2+  Achilles                                1+                         1+    Coordination  Right: Finger-to-nose normal.Left: Finger-to-nose normal.    Gait  Casual gait is normal including stance, stride, and arm swing.

## 2025-01-27 ENCOUNTER — CLINICAL SUPPORT (OUTPATIENT)
Dept: FAMILY MEDICINE CLINIC | Facility: CLINIC | Age: 60
End: 2025-01-27
Payer: COMMERCIAL

## 2025-01-27 ENCOUNTER — TELEPHONE (OUTPATIENT)
Dept: FAMILY MEDICINE CLINIC | Facility: CLINIC | Age: 60
End: 2025-01-27

## 2025-01-27 DIAGNOSIS — R68.84 JAW PAIN: Primary | ICD-10-CM

## 2025-01-27 DIAGNOSIS — G35 MULTIPLE SCLEROSIS (HCC): Primary | ICD-10-CM

## 2025-01-27 PROCEDURE — 96372 THER/PROPH/DIAG INJ SC/IM: CPT | Performed by: FAMILY MEDICINE

## 2025-01-27 RX ORDER — KETOROLAC TROMETHAMINE 30 MG/ML
60 INJECTION, SOLUTION INTRAMUSCULAR; INTRAVENOUS ONCE
Status: COMPLETED | OUTPATIENT
Start: 2025-01-27 | End: 2025-01-27

## 2025-01-27 RX ADMIN — KETOROLAC TROMETHAMINE 60 MG: 30 INJECTION, SOLUTION INTRAMUSCULAR; INTRAVENOUS at 11:41

## 2025-01-27 NOTE — TELEPHONE ENCOUNTER
Would like to know if he can get a shot of torodol?  He has a tooth abscess, dentist gave antibiotic and told him to take tylenol and advil, endodontist that he saw 2 weeks ago is closed today and he is in a lot of pain. He would like to come in around 1:15 at his lunch time.

## 2025-03-03 ENCOUNTER — APPOINTMENT (OUTPATIENT)
Age: 60
End: 2025-03-03
Payer: COMMERCIAL

## 2025-03-03 DIAGNOSIS — G35 MULTIPLE SCLEROSIS (HCC): ICD-10-CM

## 2025-03-03 LAB
ALBUMIN SERPL BCG-MCNC: 4.6 G/DL (ref 3.5–5)
ALP SERPL-CCNC: 33 U/L (ref 34–104)
ALT SERPL W P-5'-P-CCNC: 41 U/L (ref 7–52)
AST SERPL W P-5'-P-CCNC: 25 U/L (ref 13–39)
BASOPHILS # BLD AUTO: 0.06 THOUSANDS/ÂΜL (ref 0–0.1)
BASOPHILS NFR BLD AUTO: 1 % (ref 0–1)
BILIRUB DIRECT SERPL-MCNC: 0.16 MG/DL (ref 0–0.2)
BILIRUB SERPL-MCNC: 0.91 MG/DL (ref 0.2–1)
EOSINOPHIL # BLD AUTO: 0.03 THOUSAND/ÂΜL (ref 0–0.61)
EOSINOPHIL NFR BLD AUTO: 1 % (ref 0–6)
ERYTHROCYTE [DISTWIDTH] IN BLOOD BY AUTOMATED COUNT: 12.8 % (ref 11.6–15.1)
HCT VFR BLD AUTO: 47.3 % (ref 36.5–49.3)
HGB BLD-MCNC: 15.6 G/DL (ref 12–17)
IMM GRANULOCYTES # BLD AUTO: 0.01 THOUSAND/UL (ref 0–0.2)
IMM GRANULOCYTES NFR BLD AUTO: 0 % (ref 0–2)
LYMPHOCYTES # BLD AUTO: 0.75 THOUSANDS/ÂΜL (ref 0.6–4.47)
LYMPHOCYTES NFR BLD AUTO: 17 % (ref 14–44)
MCH RBC QN AUTO: 29.7 PG (ref 26.8–34.3)
MCHC RBC AUTO-ENTMCNC: 33 G/DL (ref 31.4–37.4)
MCV RBC AUTO: 90 FL (ref 82–98)
MONOCYTES # BLD AUTO: 0.4 THOUSAND/ÂΜL (ref 0.17–1.22)
MONOCYTES NFR BLD AUTO: 9 % (ref 4–12)
NEUTROPHILS # BLD AUTO: 3.16 THOUSANDS/ÂΜL (ref 1.85–7.62)
NEUTS SEG NFR BLD AUTO: 72 % (ref 43–75)
NRBC BLD AUTO-RTO: 0 /100 WBCS
PLATELET # BLD AUTO: 188 THOUSANDS/UL (ref 149–390)
PMV BLD AUTO: 10.5 FL (ref 8.9–12.7)
PROT SERPL-MCNC: 6.9 G/DL (ref 6.4–8.4)
RBC # BLD AUTO: 5.26 MILLION/UL (ref 3.88–5.62)
WBC # BLD AUTO: 4.41 THOUSAND/UL (ref 4.31–10.16)

## 2025-03-03 PROCEDURE — 80076 HEPATIC FUNCTION PANEL: CPT

## 2025-03-03 PROCEDURE — 36415 COLL VENOUS BLD VENIPUNCTURE: CPT

## 2025-03-03 PROCEDURE — 85025 COMPLETE CBC W/AUTO DIFF WBC: CPT

## 2025-03-04 ENCOUNTER — RESULTS FOLLOW-UP (OUTPATIENT)
Dept: NEUROLOGY | Facility: CLINIC | Age: 60
End: 2025-03-04

## 2025-04-03 ENCOUNTER — APPOINTMENT (OUTPATIENT)
Age: 60
End: 2025-04-03
Payer: COMMERCIAL

## 2025-04-03 DIAGNOSIS — G35 MULTIPLE SCLEROSIS (HCC): ICD-10-CM

## 2025-04-03 LAB
ALBUMIN SERPL BCG-MCNC: 4.6 G/DL (ref 3.5–5)
ALP SERPL-CCNC: 27 U/L (ref 34–104)
ALT SERPL W P-5'-P-CCNC: 21 U/L (ref 7–52)
AST SERPL W P-5'-P-CCNC: 16 U/L (ref 13–39)
BASOPHILS # BLD AUTO: 0.06 THOUSANDS/ÂΜL (ref 0–0.1)
BASOPHILS NFR BLD AUTO: 2 % (ref 0–1)
BILIRUB DIRECT SERPL-MCNC: 0.07 MG/DL (ref 0–0.2)
BILIRUB SERPL-MCNC: 1.11 MG/DL (ref 0.2–1)
EOSINOPHIL # BLD AUTO: 0.05 THOUSAND/ÂΜL (ref 0–0.61)
EOSINOPHIL NFR BLD AUTO: 1 % (ref 0–6)
ERYTHROCYTE [DISTWIDTH] IN BLOOD BY AUTOMATED COUNT: 12.8 % (ref 11.6–15.1)
HCT VFR BLD AUTO: 46.7 % (ref 36.5–49.3)
HGB BLD-MCNC: 15.3 G/DL (ref 12–17)
IMM GRANULOCYTES # BLD AUTO: 0.01 THOUSAND/UL (ref 0–0.2)
IMM GRANULOCYTES NFR BLD AUTO: 0 % (ref 0–2)
LYMPHOCYTES # BLD AUTO: 0.8 THOUSANDS/ÂΜL (ref 0.6–4.47)
LYMPHOCYTES NFR BLD AUTO: 22 % (ref 14–44)
MCH RBC QN AUTO: 29.5 PG (ref 26.8–34.3)
MCHC RBC AUTO-ENTMCNC: 32.8 G/DL (ref 31.4–37.4)
MCV RBC AUTO: 90 FL (ref 82–98)
MONOCYTES # BLD AUTO: 0.46 THOUSAND/ÂΜL (ref 0.17–1.22)
MONOCYTES NFR BLD AUTO: 12 % (ref 4–12)
NEUTROPHILS # BLD AUTO: 2.34 THOUSANDS/ÂΜL (ref 1.85–7.62)
NEUTS SEG NFR BLD AUTO: 63 % (ref 43–75)
NRBC BLD AUTO-RTO: 0 /100 WBCS
PLATELET # BLD AUTO: 180 THOUSANDS/UL (ref 149–390)
PMV BLD AUTO: 10.5 FL (ref 8.9–12.7)
PROT SERPL-MCNC: 7.1 G/DL (ref 6.4–8.4)
RBC # BLD AUTO: 5.18 MILLION/UL (ref 3.88–5.62)
WBC # BLD AUTO: 3.72 THOUSAND/UL (ref 4.31–10.16)

## 2025-04-03 PROCEDURE — 80076 HEPATIC FUNCTION PANEL: CPT

## 2025-04-03 PROCEDURE — 85025 COMPLETE CBC W/AUTO DIFF WBC: CPT

## 2025-04-03 PROCEDURE — 36415 COLL VENOUS BLD VENIPUNCTURE: CPT

## 2025-05-01 ENCOUNTER — APPOINTMENT (OUTPATIENT)
Age: 60
End: 2025-05-01
Attending: PHYSICIAN ASSISTANT
Payer: COMMERCIAL

## 2025-05-01 DIAGNOSIS — G35 MULTIPLE SCLEROSIS (HCC): ICD-10-CM

## 2025-05-01 LAB
ALBUMIN SERPL BCG-MCNC: 4.5 G/DL (ref 3.5–5)
ALP SERPL-CCNC: 32 U/L (ref 34–104)
ALT SERPL W P-5'-P-CCNC: 26 U/L (ref 7–52)
AST SERPL W P-5'-P-CCNC: 19 U/L (ref 13–39)
BASOPHILS # BLD AUTO: 0.04 THOUSANDS/ÂΜL (ref 0–0.1)
BASOPHILS NFR BLD AUTO: 1 % (ref 0–1)
BILIRUB DIRECT SERPL-MCNC: 0.15 MG/DL (ref 0–0.2)
BILIRUB SERPL-MCNC: 1.09 MG/DL (ref 0.2–1)
EOSINOPHIL # BLD AUTO: 0.04 THOUSAND/ÂΜL (ref 0–0.61)
EOSINOPHIL NFR BLD AUTO: 1 % (ref 0–6)
ERYTHROCYTE [DISTWIDTH] IN BLOOD BY AUTOMATED COUNT: 13.1 % (ref 11.6–15.1)
HCT VFR BLD AUTO: 46.9 % (ref 36.5–49.3)
HGB BLD-MCNC: 15.2 G/DL (ref 12–17)
IMM GRANULOCYTES # BLD AUTO: 0.02 THOUSAND/UL (ref 0–0.2)
IMM GRANULOCYTES NFR BLD AUTO: 1 % (ref 0–2)
LYMPHOCYTES # BLD AUTO: 0.9 THOUSANDS/ÂΜL (ref 0.6–4.47)
LYMPHOCYTES NFR BLD AUTO: 21 % (ref 14–44)
MCH RBC QN AUTO: 29.3 PG (ref 26.8–34.3)
MCHC RBC AUTO-ENTMCNC: 32.4 G/DL (ref 31.4–37.4)
MCV RBC AUTO: 90 FL (ref 82–98)
MONOCYTES # BLD AUTO: 0.39 THOUSAND/ÂΜL (ref 0.17–1.22)
MONOCYTES NFR BLD AUTO: 9 % (ref 4–12)
NEUTROPHILS # BLD AUTO: 2.82 THOUSANDS/ÂΜL (ref 1.85–7.62)
NEUTS SEG NFR BLD AUTO: 67 % (ref 43–75)
NRBC BLD AUTO-RTO: 0 /100 WBCS
PLATELET # BLD AUTO: 199 THOUSANDS/UL (ref 149–390)
PMV BLD AUTO: 10.6 FL (ref 8.9–12.7)
PROT SERPL-MCNC: 6.5 G/DL (ref 6.4–8.4)
RBC # BLD AUTO: 5.19 MILLION/UL (ref 3.88–5.62)
WBC # BLD AUTO: 4.21 THOUSAND/UL (ref 4.31–10.16)

## 2025-05-01 PROCEDURE — 85025 COMPLETE CBC W/AUTO DIFF WBC: CPT

## 2025-05-01 PROCEDURE — 80076 HEPATIC FUNCTION PANEL: CPT

## 2025-05-01 PROCEDURE — 36415 COLL VENOUS BLD VENIPUNCTURE: CPT

## 2025-05-02 ENCOUNTER — RESULTS FOLLOW-UP (OUTPATIENT)
Dept: NEUROLOGY | Facility: CLINIC | Age: 60
End: 2025-05-02

## 2025-05-02 DIAGNOSIS — G35 MULTIPLE SCLEROSIS (HCC): Primary | ICD-10-CM

## 2025-05-15 ENCOUNTER — HOSPITAL ENCOUNTER (OUTPATIENT)
Dept: MRI IMAGING | Facility: HOSPITAL | Age: 60
End: 2025-05-15
Attending: PHYSICIAN ASSISTANT
Payer: COMMERCIAL

## 2025-05-15 DIAGNOSIS — G35 MULTIPLE SCLEROSIS (HCC): ICD-10-CM

## 2025-05-15 PROCEDURE — 72157 MRI CHEST SPINE W/O & W/DYE: CPT

## 2025-05-15 PROCEDURE — A9585 GADOBUTROL INJECTION: HCPCS | Performed by: PHYSICIAN ASSISTANT

## 2025-05-15 PROCEDURE — 72156 MRI NECK SPINE W/O & W/DYE: CPT

## 2025-05-15 PROCEDURE — 70553 MRI BRAIN STEM W/O & W/DYE: CPT

## 2025-05-15 RX ORDER — GADOBUTROL 604.72 MG/ML
12 INJECTION INTRAVENOUS
Status: COMPLETED | OUTPATIENT
Start: 2025-05-15 | End: 2025-05-15

## 2025-05-15 RX ADMIN — GADOBUTROL 12 ML: 604.72 INJECTION INTRAVENOUS at 17:53

## 2025-05-22 ENCOUNTER — OFFICE VISIT (OUTPATIENT)
Dept: NEUROLOGY | Facility: CLINIC | Age: 60
End: 2025-05-22
Payer: COMMERCIAL

## 2025-05-22 VITALS
WEIGHT: 254.8 LBS | RESPIRATION RATE: 18 BRPM | SYSTOLIC BLOOD PRESSURE: 120 MMHG | DIASTOLIC BLOOD PRESSURE: 68 MMHG | HEIGHT: 75 IN | BODY MASS INDEX: 31.68 KG/M2 | OXYGEN SATURATION: 97 % | TEMPERATURE: 97.8 F | HEART RATE: 78 BPM

## 2025-05-22 DIAGNOSIS — G35 MULTIPLE SCLEROSIS (HCC): Primary | ICD-10-CM

## 2025-05-22 PROCEDURE — 99214 OFFICE O/P EST MOD 30 MIN: CPT | Performed by: PHYSICIAN ASSISTANT

## 2025-05-22 NOTE — PROGRESS NOTES
Name: Luis Armando Engel      : 1965      MRN: [id number]  Encounter Provider: Ananya Cifuentes PA-C  Encounter Date: 2025   Encounter department: [facility] [place]  :  Assessment & Plan  Multiple sclerosis (HCC)  60 year old male formally diagnosed with MS in 2018 with first presenting symptoms/event in the late  with diplopia and apparent L arm drift. He had imaging and LP at time of initial presentation with no definitive diagnosis. About 20 years later he had R thigh numbness and tingling and L hand numbness. Found to have t-spine lesion with positive LP (8 OCBs) and was started on Tecfidera in . He was on Tecfidera until . He was tolerating the medication well but had lymphopenia and his neurologist took him off the med and felt he did not need further treatment with DMT due to his age, longevity of his disease process, and at that point overall stability.      In late Oct 2024 he developed LLE numbness and tingling and was found to have an enhancing lesion at T1-2. He was given 5 days of IV steroids.     Decision was made to restart DMT due to new disease activity.  Vumerity was started in 2024, tolerating well.  He has had some mild lymphopenia, typically staying in the 800 range.  We are monitoring CBC monthly.       MRI brain, c-spine and t-spine all recently updated 5/15/2025 and stable with no evidence of disease progression.  Resolution of previously enhancing lesion in the thoracic spine at T1-2.    He has mild lymphopenia.  We discussed continuing Vumerity and continuing to monitor his CBC monthly.  If by the time he is seen at next visit and his ALC has remained stable, can space the blood work out to every other month.  If his ALC is consistently lower than 800, then would discontinue Vumerity and discuss alternative DMTs.    His neurologic exam is stable today.    He will follow-up in 4 months or sooner if needed             History of Present  Illness   HPI   Luis Armando Engel is a 60 y.o. male who presents today for follow up evaluation of Multiple Sclerosis.      Patient was previously followed by [facility], Dr. Carson, Dr. Vick, and most recently Dr. Jules, last visit in January 2021. Patient reports he was diagnosed with MS formally in 2017, but had his first symptoms with question of MS starting in the late 1990s. His first presenting symptoms were diplopia and dizziness and left arm drift. There was no definitive diagnosis made (he had a LP and brain MRI). About 20 years later he had his next exacerbation with RLE (mainly proximal) numbness and tingling, then patchy paresthesias throughout the leg, as well as left hand tingling. LP MS panel 2/2018 with 8 OCBs, there was a T6 lesion, as well as lesions in the brain at that time. MS diagnosis was made and he was advised to start DMT. He was also found to have B12 deficiency per notes. He started Tecfidera in 2018 and did well on the medication without side effects. JCV negative. He reports he was having lymphopenia with the drug that they were monitoring. In 2021, Dr. Jules said he could stop the medication due to lymphopenia and his age, and he did not require DMT since he had been stable.     More recently, patient presented to his PCP c/o LLE numbness and tingling. His PCP ordered MRI brain, oral steroids and referred to neurology. There was no weakness, no bowel/bladder changes.   He was seen by my colleague Addy Sharp on 11/5/2024 for an urgent fit in new patient appt, STAT cord imaging ordered, and advised follow up with MS team after imaging.     MRI brain MS w/wo contrast 10/30/2024  Several foci of FLAIR and T2 abnormality are identified in the juxtacortical regions, periventricular regions as well as the right medulla and cervical medullary junction compatible with the provided history of multiple sclerosis. There are no enhancing lesions identified. There are no prior studies  "available for direct comparison.     MRI c-spine 11/6/2024 w/wo contrast  No signal abnormality is noted within the cervical spinal cord. No enhancing lesion noted involving the cervical spinal cord.      MRI t-spine 11/6/2024 w/wo contrast  Demyelinating plaques involving the thoracic spinal cord on the right at T1-T2,, involving the left lateral aspect of the cord at the level of T6 focally, and possibly involving the left aspect of the thoracic spinal cord at the level of the T10-T11 intervertebral disc. Enhancement noted involving the thoracic cord demyelinating plaque at the level of T1-T2, indicative of an acute/active demyelinating plaque     Patient was given 5 days of IV steroids 11/8-11/13/2024.     I saw patient on 11/22/2024. At that time, patient was with his wife. He reported he was doing well overall, although he still had numbness and tingling in the LLE. It was a little better than prior to steroids. He noted his left hand was also tingling (this was a prior symptom). He was not having any difficulty walking, no weakness, falls, trips. No bowel or bladder changes. He was having some hoarseness of voice and saw PCP who was treating him for possible thrush related to the steroids. He was anxious about having an exacerbation after so many years. He was agreeable to resume DMT.  Labs completed 11/15/2024. JCV negative with index 0.17, NMO negative, Quantiferon Gold negative, acute hepatitis panel negative.  Patient also just wanted to mention his ocular migraines. He reported a very longstanding history of this. He typically gets a visual aura \"like a light show\" that lasts about 20 min (very stereo-typed). Can then get a HA, or sometimes not.      Today, patient reports he is overall doing well. He continues on Vumerity, which he tolerates well. Due to his history of lymphopenia with Tecfidera I suggested monthly CBC and LFTs. He continues to be compliant with his blood work.  Labs 3/3/2025 with WBC " "4.41, ALC 0.75.  This was a bit lower than his baseline.  He then completed labs 4/3/25 WBC 3.72, ALC 0.80.  Most recent labs 5/1/2025 WBC 4.21, ALC 0.90.  AST and ALT have been normal.  He does have chronically elevated bilirubin, which has been overall stable. He denies any new MS symptoms.  He recently had updated imaging.  MRI brain 5/15/2025 stable with a few scattered white matter changes correlating with history of MS.  MRI C-spine 5/15/2025 with normal cord signal.  MRI T-spine 5/15/2025 with stable lesions at T1-T2, and T6-T7.  Prior area of enhancement has resolved.    Review of Systems   Constitutional: Negative.  Negative for chills, fatigue and fever.   HENT: Negative.  Negative for hearing loss, tinnitus and trouble swallowing.    Eyes:  Negative for photophobia, pain and visual disturbance.   Respiratory: Negative.  Negative for cough and shortness of breath.    Cardiovascular: Negative.  Negative for chest pain and palpitations.   Gastrointestinal:  Negative for constipation, diarrhea, nausea and vomiting.   Endocrine: Negative.    Genitourinary: Negative.  Negative for difficulty urinating and urgency.   Musculoskeletal: Negative.  Negative for gait problem.   Skin: Negative.  Negative for rash.   Neurological: Negative.  Negative for dizziness, tremors, seizures, weakness, numbness and headaches.   Hematological: Negative.    Psychiatric/Behavioral:  Negative for confusion and sleep disturbance.     I have personally reviewed the MA's review of systems and made changes as necessary.    Medications Ordered Prior to Encounter[1]      Objective   /68 (BP Location: Left arm, Patient Position: Sitting, Cuff Size: Large)   Pulse 78   Temp 97.8 °F (36.6 °C) (Temporal)   Resp 18   Ht 6' 3\" (1.905 m)   Wt 116 kg (254 lb 12.8 oz)   SpO2 97%   BMI 31.85 kg/m²     Physical Exam  Constitutional:       Appearance: Normal appearance.     Eyes:      Extraocular Movements: EOM normal.      Pupils: " Pupils are equal, round, and reactive to light.       Neurological:      Mental Status: He is alert.      Motor: Motor strength is normal.     Deep Tendon Reflexes:      Reflex Scores:       Bicep reflexes are 2+ on the right side and 2+ on the left side.       Brachioradialis reflexes are 2+ on the right side and 2+ on the left side.       Patellar reflexes are 2+ on the right side and 2+ on the left side.       Achilles reflexes are 1+ on the right side and 1+ on the left side.    Psychiatric:         Mood and Affect: Mood normal.         Speech: Speech normal.         Behavior: Behavior normal.       Neurological Exam  Mental Status  Alert. Oriented to person, place, time and situation. Speech is normal. Language is fluent with no aphasia. Attention and concentration are normal.    Cranial Nerves  CN II: Visual fields full to confrontation.  CN III, IV, VI: Extraocular movements intact bilaterally. Pupils equal round and reactive to light bilaterally.  CN V: Facial sensation is normal.  CN VII: Full and symmetric facial movement.  CN VIII: Hearing is normal.  CN IX, X: Palate elevates symmetrically  CN XI: Shoulder shrug strength is normal.  CN XII: Tongue midline without atrophy or fasciculations.    Motor   Normal muscle tone. No abnormal involuntary movements. Strength is 5/5 throughout all four extremities.    Sensory  Light touch is normal in upper and lower extremities.     Reflexes                                            Right                      Left  Brachioradialis                    2+                         2+  Biceps                                 2+                         2+  Patellar                                2+                         2+  Achilles                                1+                         1+    Coordination  Right: Finger-to-nose normal.Left: Finger-to-nose normal.    Gait  Casual gait is normal including stance, stride, and arm swing.           [1]   Current Outpatient  Medications on File Prior to Visit   Medication Sig Dispense Refill    Vumerity 231 MG CPDR       Ativan 1 MG tablet Take 1 tablet (1 mg total) by mouth every 8 (eight) hours as needed for anxiety (Patient not taking: Reported on 5/22/2025) 90 tablet 0    clotrimazole (MYCELEX) 10 mg kenney Take 1 tablet (10 mg total) by mouth 4 (four) times a day (Patient not taking: Reported on 5/22/2025) 40 tablet 1    cyclobenzaprine (FLEXERIL) 10 mg tablet Take 1 tablet (10 mg total) by mouth daily at bedtime (Patient not taking: Reported on 5/22/2025) 15 tablet 0    meclizine (ANTIVERT) 25 mg tablet Take 1 tablet (25 mg total) by mouth every 8 (eight) hours as needed for dizziness (Patient not taking: Reported on 5/22/2025) 30 tablet 4     No current facility-administered medications on file prior to visit.

## 2025-05-22 NOTE — PATIENT INSTRUCTIONS
Continue Vumerity  Continue with monthly blood work so we can follow-up the trending of your white blood cell count (specifically the absolute lymphocyte count)  Your recent MRIs were all stable with no evidence of disease progression  Follow-up in 4 months or sooner if needed

## 2025-05-22 NOTE — ASSESSMENT & PLAN NOTE
60 year old male formally diagnosed with MS in 2018 with first presenting symptoms/event in the late 1990s with diplopia and apparent L arm drift. He had imaging and LP at time of initial presentation with no definitive diagnosis. About 20 years later he had R thigh numbness and tingling and L hand numbness. Found to have t-spine lesion with positive LP (8 OCBs) and was started on Tecfidera in 2018. He was on Tecfidera until 2021. He was tolerating the medication well but had lymphopenia and his neurologist took him off the med and felt he did not need further treatment with DMT due to his age, longevity of his disease process, and at that point overall stability.      In late Oct 2024 he developed LLE numbness and tingling and was found to have an enhancing lesion at T1-2. He was given 5 days of IV steroids.     Decision was made to restart DMT due to new disease activity.  Vumerity was started in December 2024, tolerating well.  He has had some mild lymphopenia, typically staying in the 800 range.  We are monitoring CBC monthly.       MRI brain, c-spine and t-spine all recently updated 5/15/2025 and stable with no evidence of disease progression.  Resolution of previously enhancing lesion in the thoracic spine at T1-2.    He has mild lymphopenia.  We discussed continuing Vumerity and continuing to monitor his CBC monthly.  If by the time he is seen at next visit and his ALC has remained stable, can space the blood work out to every other month.  If his ALC is consistently lower than 800, then would discontinue Vumerity and discuss alternative DMTs.    His neurologic exam is stable today.    He will follow-up in 4 months or sooner if needed

## 2025-07-11 ENCOUNTER — APPOINTMENT (OUTPATIENT)
Age: 60
End: 2025-07-11
Attending: PHYSICIAN ASSISTANT
Payer: COMMERCIAL

## 2025-07-11 DIAGNOSIS — G35 MULTIPLE SCLEROSIS (HCC): ICD-10-CM

## 2025-07-11 LAB
ALBUMIN SERPL BCG-MCNC: 4.7 G/DL (ref 3.5–5)
ALP SERPL-CCNC: 31 U/L (ref 34–104)
ALT SERPL W P-5'-P-CCNC: 23 U/L (ref 7–52)
AST SERPL W P-5'-P-CCNC: 18 U/L (ref 13–39)
BASOPHILS # BLD AUTO: 0.04 THOUSANDS/ÂΜL (ref 0–0.1)
BASOPHILS NFR BLD AUTO: 1 % (ref 0–1)
BILIRUB DIRECT SERPL-MCNC: 0.17 MG/DL (ref 0–0.2)
BILIRUB SERPL-MCNC: 1.46 MG/DL (ref 0.2–1)
EOSINOPHIL # BLD AUTO: 0.05 THOUSAND/ÂΜL (ref 0–0.61)
EOSINOPHIL NFR BLD AUTO: 2 % (ref 0–6)
ERYTHROCYTE [DISTWIDTH] IN BLOOD BY AUTOMATED COUNT: 13.4 % (ref 11.6–15.1)
HCT VFR BLD AUTO: 45.2 % (ref 36.5–49.3)
HGB BLD-MCNC: 15.1 G/DL (ref 12–17)
IMM GRANULOCYTES # BLD AUTO: 0 THOUSAND/UL (ref 0–0.2)
IMM GRANULOCYTES NFR BLD AUTO: 0 % (ref 0–2)
LYMPHOCYTES # BLD AUTO: 0.83 THOUSANDS/ÂΜL (ref 0.6–4.47)
LYMPHOCYTES NFR BLD AUTO: 25 % (ref 14–44)
MCH RBC QN AUTO: 30 PG (ref 26.8–34.3)
MCHC RBC AUTO-ENTMCNC: 33.4 G/DL (ref 31.4–37.4)
MCV RBC AUTO: 90 FL (ref 82–98)
MONOCYTES # BLD AUTO: 0.56 THOUSAND/ÂΜL (ref 0.17–1.22)
MONOCYTES NFR BLD AUTO: 17 % (ref 4–12)
NEUTROPHILS # BLD AUTO: 1.91 THOUSANDS/ÂΜL (ref 1.85–7.62)
NEUTS SEG NFR BLD AUTO: 55 % (ref 43–75)
NRBC BLD AUTO-RTO: 0 /100 WBCS
PLATELET # BLD AUTO: 182 THOUSANDS/UL (ref 149–390)
PMV BLD AUTO: 10.5 FL (ref 8.9–12.7)
PROT SERPL-MCNC: 7 G/DL (ref 6.4–8.4)
RBC # BLD AUTO: 5.03 MILLION/UL (ref 3.88–5.62)
WBC # BLD AUTO: 3.39 THOUSAND/UL (ref 4.31–10.16)

## 2025-07-11 PROCEDURE — 80076 HEPATIC FUNCTION PANEL: CPT

## 2025-07-11 PROCEDURE — 36415 COLL VENOUS BLD VENIPUNCTURE: CPT

## 2025-07-11 PROCEDURE — 85025 COMPLETE CBC W/AUTO DIFF WBC: CPT
